# Patient Record
Sex: MALE | Race: WHITE | Employment: FULL TIME | URBAN - METROPOLITAN AREA
[De-identification: names, ages, dates, MRNs, and addresses within clinical notes are randomized per-mention and may not be internally consistent; named-entity substitution may affect disease eponyms.]

---

## 2018-06-26 ENCOUNTER — HOSPITAL ENCOUNTER (INPATIENT)
Facility: CLINIC | Age: 63
LOS: 2 days | Discharge: HOME | DRG: 481 | End: 2018-06-29
Attending: EMERGENCY MEDICINE
Payer: OTHER MISCELLANEOUS

## 2018-06-26 ENCOUNTER — APPOINTMENT (OUTPATIENT)
Dept: GENERAL RADIOLOGY | Facility: CLINIC | Age: 63
DRG: 481 | End: 2018-06-26
Attending: EMERGENCY MEDICINE
Payer: OTHER MISCELLANEOUS
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
## 2018-06-26 DIAGNOSIS — S72.002A CLOSED FRACTURE OF NECK OF LEFT FEMUR, INITIAL ENCOUNTER (H): ICD-10-CM

## 2018-06-26 PROBLEM — N52.9 ED (ERECTILE DYSFUNCTION): Status: ACTIVE | Noted: 2018-06-26

## 2018-06-26 PROBLEM — E78.2 MIXED HYPERLIPIDEMIA: Status: ACTIVE | Noted: 2018-06-26
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
## 2018-06-26 PROBLEM — E87.6 HYPOKALEMIA: Status: ACTIVE | Noted: 2018-06-26

## 2018-06-26 PROBLEM — Q23.81 BICUSPID AORTIC VALVE: Status: ACTIVE | Noted: 2018-06-26

## 2018-06-26 PROBLEM — F17.200 TOBACCO DEPENDENCE SYNDROME: Status: ACTIVE | Noted: 2018-06-26

## 2018-06-26 PROBLEM — R91.8 PULMONARY NODULES: Status: ACTIVE | Noted: 2018-06-26

## 2018-06-26 PROBLEM — D72.829 LEUKOCYTOSIS: Status: ACTIVE | Noted: 2018-06-26

## 2018-06-26 PROBLEM — I35.0 AORTIC VALVE STENOSIS: Status: ACTIVE | Noted: 2018-06-26

## 2018-06-26 PROBLEM — Z79.4 LONG-TERM INSULIN USE IN TYPE 2 DIABETES (H): Status: ACTIVE | Noted: 2018-06-26

## 2018-06-26 PROBLEM — E11.9 LONG-TERM INSULIN USE IN TYPE 2 DIABETES (H): Status: ACTIVE | Noted: 2018-06-26

## 2018-06-26 PROBLEM — F43.23 ADJUSTMENT DISORDER WITH MIXED ANXIETY AND DEPRESSED MOOD: Status: ACTIVE | Noted: 2018-06-26

## 2018-06-26 PROBLEM — I10 BENIGN ESSENTIAL HYPERTENSION: Status: ACTIVE | Noted: 2018-06-26

## 2018-06-26 LAB
ANION GAP SERPL CALCULATED.3IONS-SCNC: 4 MMOL/L (ref 3–14)
BASOPHILS # BLD AUTO: 0 10E9/L (ref 0–0.2)
BASOPHILS NFR BLD AUTO: 0.1 %
BUN SERPL-MCNC: 28 MG/DL (ref 7–30)
CALCIUM SERPL-MCNC: 8.9 MG/DL (ref 8.5–10.1)
CHLORIDE SERPL-SCNC: 103 MMOL/L (ref 94–109)
CO2 SERPL-SCNC: 28 MMOL/L (ref 20–32)
CREAT SERPL-MCNC: 0.97 MG/DL (ref 0.66–1.25)
DIFFERENTIAL METHOD BLD: ABNORMAL
EOSINOPHIL # BLD AUTO: 0.1 10E9/L (ref 0–0.7)
EOSINOPHIL NFR BLD AUTO: 0.5 %
ERYTHROCYTE [DISTWIDTH] IN BLOOD BY AUTOMATED COUNT: 13.4 % (ref 10–15)
GFR SERPL CREATININE-BSD FRML MDRD: 78 ML/MIN/1.7M2
GLUCOSE BLDC GLUCOMTR-MCNC: 114 MG/DL (ref 70–99)
GLUCOSE SERPL-MCNC: 62 MG/DL (ref 70–99)
HCT VFR BLD AUTO: 41.7 % (ref 40–53)
HGB BLD-MCNC: 13.5 G/DL (ref 13.3–17.7)
IMM GRANULOCYTES # BLD: 0.1 10E9/L (ref 0–0.4)
IMM GRANULOCYTES NFR BLD: 0.5 %
LYMPHOCYTES # BLD AUTO: 1.3 10E9/L (ref 0.8–5.3)
LYMPHOCYTES NFR BLD AUTO: 7.3 %
MCH RBC QN AUTO: 27.6 PG (ref 26.5–33)
MCHC RBC AUTO-ENTMCNC: 32.4 G/DL (ref 31.5–36.5)
MCV RBC AUTO: 85 FL (ref 78–100)
MONOCYTES # BLD AUTO: 0.5 10E9/L (ref 0–1.3)
MONOCYTES NFR BLD AUTO: 3.1 %
NEUTROPHILS # BLD AUTO: 15.2 10E9/L (ref 1.6–8.3)
NEUTROPHILS NFR BLD AUTO: 88.5 %
NRBC # BLD AUTO: 0 10*3/UL
NRBC BLD AUTO-RTO: 0 /100
PLATELET # BLD AUTO: 222 10E9/L (ref 150–450)
POTASSIUM SERPL-SCNC: 3.3 MMOL/L (ref 3.4–5.3)
RBC # BLD AUTO: 4.9 10E12/L (ref 4.4–5.9)
SODIUM SERPL-SCNC: 135 MMOL/L (ref 133–144)
WBC # BLD AUTO: 17.2 10E9/L (ref 4–11)

## 2018-06-26 PROCEDURE — 96374 THER/PROPH/DIAG INJ IV PUSH: CPT | Performed by: EMERGENCY MEDICINE

## 2018-06-26 PROCEDURE — 99220 ZZC INITIAL OBSERVATION CARE,LEVL III: CPT | Performed by: PHYSICIAN ASSISTANT

## 2018-06-26 PROCEDURE — 25000128 H RX IP 250 OP 636: Performed by: EMERGENCY MEDICINE

## 2018-06-26 PROCEDURE — 85025 COMPLETE CBC W/AUTO DIFF WBC: CPT | Performed by: EMERGENCY MEDICINE

## 2018-06-26 PROCEDURE — 00000146 ZZHCL STATISTIC GLUCOSE BY METER IP

## 2018-06-26 PROCEDURE — G0378 HOSPITAL OBSERVATION PER HR: HCPCS

## 2018-06-26 PROCEDURE — 99285 EMERGENCY DEPT VISIT HI MDM: CPT | Mod: Z6 | Performed by: EMERGENCY MEDICINE

## 2018-06-26 PROCEDURE — 25000132 ZZH RX MED GY IP 250 OP 250 PS 637: Performed by: PHYSICIAN ASSISTANT

## 2018-06-26 PROCEDURE — 80048 BASIC METABOLIC PNL TOTAL CA: CPT | Performed by: EMERGENCY MEDICINE

## 2018-06-26 PROCEDURE — 96376 TX/PRO/DX INJ SAME DRUG ADON: CPT

## 2018-06-26 PROCEDURE — 25000132 ZZH RX MED GY IP 250 OP 250 PS 637: Performed by: EMERGENCY MEDICINE

## 2018-06-26 PROCEDURE — 73502 X-RAY EXAM HIP UNI 2-3 VIEWS: CPT

## 2018-06-26 PROCEDURE — 72100 X-RAY EXAM L-S SPINE 2/3 VWS: CPT

## 2018-06-26 PROCEDURE — 99285 EMERGENCY DEPT VISIT HI MDM: CPT | Mod: 25 | Performed by: EMERGENCY MEDICINE

## 2018-06-26 PROCEDURE — 99207 ZZC CDG-CODE CATEGORY CHANGED: CPT | Performed by: PHYSICIAN ASSISTANT

## 2018-06-26 PROCEDURE — 96375 TX/PRO/DX INJ NEW DRUG ADDON: CPT | Performed by: EMERGENCY MEDICINE

## 2018-06-26 RX ORDER — CITALOPRAM HYDROBROMIDE 40 MG/1
40 TABLET ORAL DAILY
COMMUNITY
Start: 2017-12-19

## 2018-06-26 RX ORDER — POTASSIUM CHLORIDE 29.8 MG/ML
20 INJECTION INTRAVENOUS
Status: DISCONTINUED | OUTPATIENT
Start: 2018-06-26 | End: 2018-06-26 | Stop reason: CLARIF

## 2018-06-26 RX ORDER — POTASSIUM CHLORIDE 1500 MG/1
20-40 TABLET, EXTENDED RELEASE ORAL
Status: DISCONTINUED | OUTPATIENT
Start: 2018-06-26 | End: 2018-06-29 | Stop reason: HOSPADM

## 2018-06-26 RX ORDER — ACETAMINOPHEN 650 MG/1
650 SUPPOSITORY RECTAL EVERY 4 HOURS PRN
Status: DISCONTINUED | OUTPATIENT
Start: 2018-06-26 | End: 2018-06-29 | Stop reason: HOSPADM

## 2018-06-26 RX ORDER — CITALOPRAM HYDROBROMIDE 20 MG/10ML
40 SOLUTION, ORAL ORAL DAILY
COMMUNITY
End: 2018-06-26

## 2018-06-26 RX ORDER — HYDROXYZINE HYDROCHLORIDE 50 MG/ML
50 INJECTION, SOLUTION INTRAMUSCULAR EVERY 6 HOURS PRN
Status: DISCONTINUED | OUTPATIENT
Start: 2018-06-26 | End: 2018-06-26

## 2018-06-26 RX ORDER — HYDROCHLOROTHIAZIDE 25 MG/1
25 TABLET ORAL DAILY
Status: DISCONTINUED | OUTPATIENT
Start: 2018-06-27 | End: 2018-06-27

## 2018-06-26 RX ORDER — PROCHLORPERAZINE MALEATE 10 MG
10 TABLET ORAL EVERY 6 HOURS PRN
Status: DISCONTINUED | OUTPATIENT
Start: 2018-06-26 | End: 2018-06-29 | Stop reason: HOSPADM

## 2018-06-26 RX ORDER — NICOTINE POLACRILEX 4 MG
15-30 LOZENGE BUCCAL
Status: DISCONTINUED | OUTPATIENT
Start: 2018-06-26 | End: 2018-06-29 | Stop reason: HOSPADM

## 2018-06-26 RX ORDER — LISINOPRIL AND HYDROCHLOROTHIAZIDE 12.5; 2 MG/1; MG/1
2 TABLET ORAL DAILY
Status: DISCONTINUED | OUTPATIENT
Start: 2018-06-27 | End: 2018-06-26 | Stop reason: CLARIF

## 2018-06-26 RX ORDER — POTASSIUM CHLORIDE 1.5 G/1.58G
20-40 POWDER, FOR SOLUTION ORAL
Status: DISCONTINUED | OUTPATIENT
Start: 2018-06-26 | End: 2018-06-29 | Stop reason: HOSPADM

## 2018-06-26 RX ORDER — NALOXONE HYDROCHLORIDE 0.4 MG/ML
.1-.4 INJECTION, SOLUTION INTRAMUSCULAR; INTRAVENOUS; SUBCUTANEOUS
Status: DISCONTINUED | OUTPATIENT
Start: 2018-06-26 | End: 2018-06-27

## 2018-06-26 RX ORDER — POTASSIUM CL/LIDO/0.9 % NACL 10MEQ/0.1L
10 INTRAVENOUS SOLUTION, PIGGYBACK (ML) INTRAVENOUS
Status: DISCONTINUED | OUTPATIENT
Start: 2018-06-26 | End: 2018-06-29 | Stop reason: HOSPADM

## 2018-06-26 RX ORDER — HYDROXYZINE HYDROCHLORIDE 25 MG/1
25 TABLET, FILM COATED ORAL EVERY 6 HOURS PRN
Status: DISCONTINUED | OUTPATIENT
Start: 2018-06-26 | End: 2018-06-29 | Stop reason: HOSPADM

## 2018-06-26 RX ORDER — VARENICLINE TARTRATE 1 MG/1
1 TABLET, FILM COATED ORAL 2 TIMES DAILY WITH MEALS
COMMUNITY
Start: 2018-06-19 | End: 2019-01-10

## 2018-06-26 RX ORDER — DEXTROSE MONOHYDRATE 25 G/50ML
25-50 INJECTION, SOLUTION INTRAVENOUS
Status: DISCONTINUED | OUTPATIENT
Start: 2018-06-26 | End: 2018-06-29 | Stop reason: HOSPADM

## 2018-06-26 RX ORDER — KETOROLAC TROMETHAMINE 30 MG/ML
30 INJECTION, SOLUTION INTRAMUSCULAR; INTRAVENOUS EVERY 6 HOURS PRN
Status: DISCONTINUED | OUTPATIENT
Start: 2018-06-26 | End: 2018-06-29 | Stop reason: HOSPADM

## 2018-06-26 RX ORDER — ONDANSETRON 2 MG/ML
4 INJECTION INTRAMUSCULAR; INTRAVENOUS EVERY 6 HOURS PRN
Status: DISCONTINUED | OUTPATIENT
Start: 2018-06-26 | End: 2018-06-27

## 2018-06-26 RX ORDER — OXYCODONE HYDROCHLORIDE 5 MG/1
5-10 TABLET ORAL
Status: DISCONTINUED | OUTPATIENT
Start: 2018-06-26 | End: 2018-06-29 | Stop reason: HOSPADM

## 2018-06-26 RX ORDER — ACETAMINOPHEN 325 MG/1
650 TABLET ORAL EVERY 4 HOURS PRN
Status: DISCONTINUED | OUTPATIENT
Start: 2018-06-26 | End: 2018-06-29 | Stop reason: HOSPADM

## 2018-06-26 RX ORDER — HYDROMORPHONE HYDROCHLORIDE 1 MG/ML
.3-.5 INJECTION, SOLUTION INTRAMUSCULAR; INTRAVENOUS; SUBCUTANEOUS
Status: DISCONTINUED | OUTPATIENT
Start: 2018-06-26 | End: 2018-06-29 | Stop reason: HOSPADM

## 2018-06-26 RX ORDER — HYDROXYZINE HYDROCHLORIDE 50 MG/1
50 TABLET, FILM COATED ORAL EVERY 6 HOURS PRN
Status: DISCONTINUED | OUTPATIENT
Start: 2018-06-26 | End: 2018-06-29 | Stop reason: HOSPADM

## 2018-06-26 RX ORDER — POTASSIUM CHLORIDE 7.45 MG/ML
10 INJECTION INTRAVENOUS
Status: DISCONTINUED | OUTPATIENT
Start: 2018-06-26 | End: 2018-06-29 | Stop reason: HOSPADM

## 2018-06-26 RX ORDER — LIDOCAINE 40 MG/G
CREAM TOPICAL
Status: DISCONTINUED | OUTPATIENT
Start: 2018-06-26 | End: 2018-06-29 | Stop reason: HOSPADM

## 2018-06-26 RX ORDER — ONDANSETRON 2 MG/ML
4 INJECTION INTRAMUSCULAR; INTRAVENOUS EVERY 6 HOURS PRN
Status: DISCONTINUED | OUTPATIENT
Start: 2018-06-26 | End: 2018-06-26

## 2018-06-26 RX ORDER — ACYCLOVIR 50 MG/G
CREAM TOPICAL
COMMUNITY
Start: 2016-02-29 | End: 2019-01-10

## 2018-06-26 RX ORDER — CITALOPRAM HYDROBROMIDE 40 MG/1
40 TABLET ORAL DAILY
Status: DISCONTINUED | OUTPATIENT
Start: 2018-06-27 | End: 2018-06-29 | Stop reason: HOSPADM

## 2018-06-26 RX ORDER — ACETAMINOPHEN 325 MG/1
650 TABLET ORAL EVERY 4 HOURS PRN
Status: DISCONTINUED | OUTPATIENT
Start: 2018-06-26 | End: 2018-06-27

## 2018-06-26 RX ORDER — OXYCODONE HYDROCHLORIDE 5 MG/1
10 TABLET ORAL ONCE
Status: COMPLETED | OUTPATIENT
Start: 2018-06-26 | End: 2018-06-26

## 2018-06-26 RX ORDER — PROCHLORPERAZINE 25 MG
25 SUPPOSITORY, RECTAL RECTAL EVERY 12 HOURS PRN
Status: DISCONTINUED | OUTPATIENT
Start: 2018-06-26 | End: 2018-06-29 | Stop reason: HOSPADM

## 2018-06-26 RX ORDER — VALACYCLOVIR HYDROCHLORIDE 1 G/1
2 TABLET, FILM COATED ORAL 2 TIMES DAILY PRN
COMMUNITY
Start: 2017-10-09 | End: 2019-01-10

## 2018-06-26 RX ORDER — NALOXONE HYDROCHLORIDE 0.4 MG/ML
.1-.4 INJECTION, SOLUTION INTRAMUSCULAR; INTRAVENOUS; SUBCUTANEOUS
Status: DISCONTINUED | OUTPATIENT
Start: 2018-06-26 | End: 2018-06-26

## 2018-06-26 RX ORDER — ONDANSETRON 4 MG/1
4 TABLET, ORALLY DISINTEGRATING ORAL EVERY 6 HOURS PRN
Status: DISCONTINUED | OUTPATIENT
Start: 2018-06-26 | End: 2018-06-27

## 2018-06-26 RX ORDER — PROCHLORPERAZINE 25 MG
25 SUPPOSITORY, RECTAL RECTAL EVERY 12 HOURS PRN
Status: DISCONTINUED | OUTPATIENT
Start: 2018-06-26 | End: 2018-06-27

## 2018-06-26 RX ORDER — PROCHLORPERAZINE MALEATE 10 MG
10 TABLET ORAL EVERY 6 HOURS PRN
Status: DISCONTINUED | OUTPATIENT
Start: 2018-06-26 | End: 2018-06-27

## 2018-06-26 RX ORDER — LISINOPRIL AND HYDROCHLOROTHIAZIDE 12.5; 2 MG/1; MG/1
2 TABLET ORAL DAILY
COMMUNITY
Start: 2018-06-19

## 2018-06-26 RX ORDER — LISINOPRIL 40 MG/1
40 TABLET ORAL DAILY
Status: DISCONTINUED | OUTPATIENT
Start: 2018-06-27 | End: 2018-06-27

## 2018-06-26 RX ORDER — INSULIN GLARGINE 100 [IU]/ML
44 INJECTION, SOLUTION SUBCUTANEOUS EVERY MORNING
COMMUNITY

## 2018-06-26 RX ORDER — HYDROMORPHONE HYDROCHLORIDE 1 MG/ML
0.5 INJECTION, SOLUTION INTRAMUSCULAR; INTRAVENOUS; SUBCUTANEOUS
Status: DISCONTINUED | OUTPATIENT
Start: 2018-06-26 | End: 2018-06-26 | Stop reason: DRUGHIGH

## 2018-06-26 RX ORDER — ONDANSETRON 4 MG/1
4 TABLET, ORALLY DISINTEGRATING ORAL EVERY 6 HOURS PRN
Status: DISCONTINUED | OUTPATIENT
Start: 2018-06-26 | End: 2018-06-26

## 2018-06-26 RX ADMIN — HYDROXYZINE 50 MG: 50 TABLET, FILM COATED ORAL at 22:21

## 2018-06-26 RX ADMIN — HYDROMORPHONE HYDROCHLORIDE 0.5 MG: 1 INJECTION, SOLUTION INTRAMUSCULAR; INTRAVENOUS; SUBCUTANEOUS at 19:51

## 2018-06-26 RX ADMIN — HYDROMORPHONE HYDROCHLORIDE 0.5 MG: 1 INJECTION, SOLUTION INTRAMUSCULAR; INTRAVENOUS; SUBCUTANEOUS at 19:12

## 2018-06-26 RX ADMIN — OXYCODONE HYDROCHLORIDE 10 MG: 5 TABLET ORAL at 21:35

## 2018-06-26 RX ADMIN — OXYCODONE HYDROCHLORIDE 10 MG: 5 TABLET ORAL at 17:55

## 2018-06-26 RX ADMIN — HYDROMORPHONE HYDROCHLORIDE 0.5 MG: 1 INJECTION, SOLUTION INTRAMUSCULAR; INTRAVENOUS; SUBCUTANEOUS at 21:35

## 2018-06-26 NOTE — IP AVS SNAPSHOT
[facility]    [address] [address]    Phone:  [phone]    Fax:  [phone]                                       After Visit Summary   6/26/2018    Calos Callahan    MRN: [id number]           After Visit Summary Signature Page     I have received my discharge instructions, and my questions have been answered. I have discussed any challenges I see with this plan with the nurse or doctor.    ..........................................................................................................................................  Patient/Patient Representative Signature      ..........................................................................................................................................  Patient Representative Print Name and Relationship to Patient    ..................................................               ................................................  Date                                            Time    ..........................................................................................................................................  Reviewed by Signature/Title    ...................................................              ..............................................  Date                                                            Time

## 2018-06-26 NOTE — ED TRIAGE NOTES
Pt fell from a ladder, 5 feet, landing on left leg. Has left hip pain with bruising present. Unable to bear weight now. Left elbow abrasion also present. Denies LOC.

## 2018-06-26 NOTE — ED PROVIDER NOTES
"  History     Chief Complaint   Patient presents with     Fall     HPI  Calos Callahan is a 63 year old male who presents for evaluation after a fall.  The patient was working, says he was about 5 feet up on a ladder when he lost his balance and jumped from the latter to prevent falling.  He landed directly on the heel of his left leg and had immediate onset of left hip pain and fell to the ground.  He did not hit his head or loss of consciousness.  He is complaining of severe pain in the left hip, worse with any movement, rated as severe, described as throbbing and aching.  He has not taken ibuprofen with no improvement in symptoms.  He denies headache, neck pain, nausea, vomiting, drainage from the ears or nose, chest pain, difficulty breathing, abdominal pain.    Problem List:    There are no active problems to display for this patient.       Past Medical History:    Past Medical History:   Diagnosis Date     Depressive disorder      Diabetes (H)      Hypertension      Murmur        Past Surgical History:    History reviewed. No pertinent surgical history.    Family History:    No family history on file.    Social History:  Marital Status:   [2]  Social History   Substance Use Topics     Smoking status: Light Tobacco Smoker     Packs/day: 0.25     Smokeless tobacco: Never Used      Comment: cigars, quitting     Alcohol use Yes        Medications:      No current outpatient prescriptions on file.      Review of Systems  Pertinent positives and negatives listed in the HPI, all other systems reviewed and are negative.    Physical Exam   BP: 148/81  Pulse: 74  Temp: 97.7  F (36.5  C)  Resp: 18  Height: 185.4 cm (6' 1\")  Weight: 94.3 kg (208 lb)  SpO2: 96 %      Physical Exam  /67  Pulse 68  Temp 97.7  F (36.5  C) (Oral)  Resp 16  Ht 1.854 m (6' 1\")  Wt 94.3 kg (208 lb)  SpO2 90%  BMI 27.44 kg/m2   GENERAL: Alert, cooperative, moderate distress  HEAD: No scalp laceration, hematoma, or abrasion.  " No tenderness.  EYES: Conjunctivae clear, EOM intact. PERLL, Pupils are 3 mm.  HEENT:  Normal external ears, normal TM's without evidence of hemotympanum.  No nasal discharge or evidence of septal hematoma. No facial instability or asymmetry. No evidence of intraoral trauma.  Intact bite without malalignment.  NECK: Full painless range of motion.  No midline tenderness, no lateral tenderness.  CHEST:  No crepitus or tenderness with AP or lateral compression  CARDIOVASCULAR : Nml rate, distal pulses are normal and symmetric  LUNGS: No respiratory distress.  GI: Soft, ND, NT.   PELVIS: No crepitus or tenderness with AP or lateral compression  BACK: No step-offs palpated, no tenderness to palpation of thoracic or lumbar spine.  EXTREMITIES: No obvious deformities, tenderness to palpation of left hip with pain elicited by any movement of the hip  NEUROLOGICAL : GCS= 15.  Awake, alert, and oriented x 3.  Cranial nerves II-XII grossly intact. Normal muscle strength and tone, sensation to light touch grossly intact in all extremities.  No focal deficits.   SKIN : Normal color, no jaundice or rash. Left hip and left elbow abrasions.      ED Course     ED Course     Procedures               Critical Care time:  none               Results for orders placed or performed during the hospital encounter of 06/26/18 (from the past 24 hour(s))   Pelvis XR w/ unilateral hip left    Narrative    PELVIS WITH LEFT HIP LATERAL TWO VIEWS  6/26/2018 6:27 PM     HISTORY: Hip pain, jump from ladder.     COMPARISON: None.      Impression    IMPRESSION: There is a probable fracture through the left femoral neck  which probably extends into the intertrochanteric region. If  indicated, CT might be helpful in further evaluation.    SOWMYA CAT MD   Basic metabolic panel   Result Value Ref Range    Sodium 135 133 - 144 mmol/L    Potassium 3.3 (L) 3.4 - 5.3 mmol/L    Chloride 103 94 - 109 mmol/L    Carbon Dioxide 28 20 - 32 mmol/L    Anion Gap 4 3  - 14 mmol/L    Glucose 62 (L) 70 - 99 mg/dL    Urea Nitrogen 28 7 - 30 mg/dL    Creatinine 0.97 0.66 - 1.25 mg/dL    GFR Estimate 78 >60 mL/min/1.7m2    GFR Estimate If Black >90 >60 mL/min/1.7m2    Calcium 8.9 8.5 - 10.1 mg/dL   CBC with platelets differential   Result Value Ref Range    WBC 17.2 (H) 4.0 - 11.0 10e9/L    RBC Count 4.90 4.4 - 5.9 10e12/L    Hemoglobin 13.5 13.3 - 17.7 g/dL    Hematocrit 41.7 40.0 - 53.0 %    MCV 85 78 - 100 fl    MCH 27.6 26.5 - 33.0 pg    MCHC 32.4 31.5 - 36.5 g/dL    RDW 13.4 10.0 - 15.0 %    Platelet Count 222 150 - 450 10e9/L    Diff Method Automated Method     % Neutrophils 88.5 %    % Lymphocytes 7.3 %    % Monocytes 3.1 %    % Eosinophils 0.5 %    % Basophils 0.1 %    % Immature Granulocytes 0.5 %    Nucleated RBCs 0 0 /100    Absolute Neutrophil 15.2 (H) 1.6 - 8.3 10e9/L    Absolute Lymphocytes 1.3 0.8 - 5.3 10e9/L    Absolute Monocytes 0.5 0.0 - 1.3 10e9/L    Absolute Eosinophils 0.1 0.0 - 0.7 10e9/L    Absolute Basophils 0.0 0.0 - 0.2 10e9/L    Abs Immature Granulocytes 0.1 0 - 0.4 10e9/L    Absolute Nucleated RBC 0.0        Medications   HYDROmorphone (PF) (DILAUDID) injection 0.5 mg (0.5 mg Intravenous Given 6/26/18 1951)   oxyCODONE IR (ROXICODONE) tablet 10 mg (10 mg Oral Given 6/26/18 1755)       Assessments & Plan (with Medical Decision Making)   63-year-old male who presents for left hip pain after a fall.  Temperature is 36.5 C, heart 74, SPO2 is 96% on room air.  Is given oral oxycodone for pain.  X-ray of the hip and L spine obtained, images reviewed independently as well as radiology read reviewed, positive for fracture of the left femoral neck.  On recheck the patient is comfortable.  I have discussed the case with the on-call orthopedic surgeon and have forwarded to him pictures of the XR images.  The plan is to bring the patient to the operating room tomorrow. The patient is admitted to the medicine service for further treatment. I have discussed the case  with the oncall hospitalist Karen GRIJALVA who agrees to accept the patient to her service.    I have reviewed the nursing notes.    I have reviewed the findings, diagnosis, plan and need for follow up with the patient.       Current Discharge Medication List          Final diagnoses:   Closed fracture of neck of left femur, initial encounter (H)       6/26/2018   [facility] EMERGENCY DEPARTMENT     Alvaro Martin MD  06/26/18 8522

## 2018-06-27 ENCOUNTER — APPOINTMENT (OUTPATIENT)
Dept: GENERAL RADIOLOGY | Facility: CLINIC | Age: 63
DRG: 481 | End: 2018-06-27
Attending: ORTHOPAEDIC SURGERY
Payer: OTHER MISCELLANEOUS

## 2018-06-27 ENCOUNTER — ANESTHESIA EVENT (OUTPATIENT)
Dept: SURGERY | Facility: CLINIC | Age: 63
DRG: 481 | End: 2018-06-27
Payer: OTHER MISCELLANEOUS

## 2018-06-27 ENCOUNTER — ANESTHESIA (OUTPATIENT)
Dept: SURGERY | Facility: CLINIC | Age: 63
DRG: 481 | End: 2018-06-27
Payer: OTHER MISCELLANEOUS

## 2018-06-27 ENCOUNTER — SURGERY (OUTPATIENT)
Age: 63
End: 2018-06-27

## 2018-06-27 PROBLEM — S72.009A HIP FRACTURE REQUIRING OPERATIVE REPAIR (H): Status: ACTIVE | Noted: 2018-06-27

## 2018-06-27 LAB
ERYTHROCYTE [DISTWIDTH] IN BLOOD BY AUTOMATED COUNT: 13.6 % (ref 10–15)
GLUCOSE BLDC GLUCOMTR-MCNC: 100 MG/DL (ref 70–99)
GLUCOSE BLDC GLUCOMTR-MCNC: 109 MG/DL (ref 70–99)
GLUCOSE BLDC GLUCOMTR-MCNC: 119 MG/DL (ref 70–99)
GLUCOSE BLDC GLUCOMTR-MCNC: 260 MG/DL (ref 70–99)
GLUCOSE BLDC GLUCOMTR-MCNC: 324 MG/DL (ref 70–99)
HCT VFR BLD AUTO: 38.9 % (ref 40–53)
HGB BLD-MCNC: 12.4 G/DL (ref 13.3–17.7)
MCH RBC QN AUTO: 27.5 PG (ref 26.5–33)
MCHC RBC AUTO-ENTMCNC: 31.9 G/DL (ref 31.5–36.5)
MCV RBC AUTO: 86 FL (ref 78–100)
PLATELET # BLD AUTO: 162 10E9/L (ref 150–450)
POTASSIUM SERPL-SCNC: 3.7 MMOL/L (ref 3.4–5.3)
RBC # BLD AUTO: 4.51 10E12/L (ref 4.4–5.9)
WBC # BLD AUTO: 10.9 10E9/L (ref 4–11)

## 2018-06-27 PROCEDURE — 37000008 ZZH ANESTHESIA TECHNICAL FEE, 1ST 30 MIN: Performed by: ORTHOPAEDIC SURGERY

## 2018-06-27 PROCEDURE — 25000132 ZZH RX MED GY IP 250 OP 250 PS 637: Performed by: EMERGENCY MEDICINE

## 2018-06-27 PROCEDURE — 25800025 ZZH RX 258: Performed by: ORTHOPAEDIC SURGERY

## 2018-06-27 PROCEDURE — 71000015 ZZH RECOVERY PHASE 1 LEVEL 2 EA ADDTL HR: Performed by: ORTHOPAEDIC SURGERY

## 2018-06-27 PROCEDURE — 36000065 ZZH SURGERY LEVEL 4 W FLUORO 1ST 30 MIN: Performed by: ORTHOPAEDIC SURGERY

## 2018-06-27 PROCEDURE — 25000132 ZZH RX MED GY IP 250 OP 250 PS 637: Performed by: PHYSICIAN ASSISTANT

## 2018-06-27 PROCEDURE — 85027 COMPLETE CBC AUTOMATED: CPT | Performed by: PHYSICIAN ASSISTANT

## 2018-06-27 PROCEDURE — 25000132 ZZH RX MED GY IP 250 OP 250 PS 637

## 2018-06-27 PROCEDURE — 25000128 H RX IP 250 OP 636: Performed by: PHYSICIAN ASSISTANT

## 2018-06-27 PROCEDURE — 96375 TX/PRO/DX INJ NEW DRUG ADDON: CPT

## 2018-06-27 PROCEDURE — 25000132 ZZH RX MED GY IP 250 OP 250 PS 637: Performed by: ORTHOPAEDIC SURGERY

## 2018-06-27 PROCEDURE — 25000128 H RX IP 250 OP 636: Performed by: NURSE ANESTHETIST, CERTIFIED REGISTERED

## 2018-06-27 PROCEDURE — 25000131 ZZH RX MED GY IP 250 OP 636 PS 637

## 2018-06-27 PROCEDURE — 99233 SBSQ HOSP IP/OBS HIGH 50: CPT

## 2018-06-27 PROCEDURE — C1713 ANCHOR/SCREW BN/BN,TIS/BN: HCPCS | Performed by: ORTHOPAEDIC SURGERY

## 2018-06-27 PROCEDURE — 37000009 ZZH ANESTHESIA TECHNICAL FEE, EACH ADDTL 15 MIN: Performed by: ORTHOPAEDIC SURGERY

## 2018-06-27 PROCEDURE — 25000566 ZZH SEVOFLURANE, EA 15 MIN: Performed by: ORTHOPAEDIC SURGERY

## 2018-06-27 PROCEDURE — 36415 COLL VENOUS BLD VENIPUNCTURE: CPT

## 2018-06-27 PROCEDURE — 40000306 ZZH STATISTIC PRE PROC ASSESS II: Performed by: ORTHOPAEDIC SURGERY

## 2018-06-27 PROCEDURE — 25000128 H RX IP 250 OP 636: Performed by: EMERGENCY MEDICINE

## 2018-06-27 PROCEDURE — 00000146 ZZHCL STATISTIC GLUCOSE BY METER IP

## 2018-06-27 PROCEDURE — 0QH734Z INSERTION OF INTERNAL FIXATION DEVICE INTO LEFT UPPER FEMUR, PERCUTANEOUS APPROACH: ICD-10-PCS | Performed by: ORTHOPAEDIC SURGERY

## 2018-06-27 PROCEDURE — 93005 ELECTROCARDIOGRAM TRACING: CPT

## 2018-06-27 PROCEDURE — G0378 HOSPITAL OBSERVATION PER HR: HCPCS

## 2018-06-27 PROCEDURE — 40000277 XR SURGERY CARM FLUORO LESS THAN 5 MIN W STILLS

## 2018-06-27 PROCEDURE — 96376 TX/PRO/DX INJ SAME DRUG ADON: CPT

## 2018-06-27 PROCEDURE — 25000125 ZZHC RX 250: Performed by: NURSE ANESTHETIST, CERTIFIED REGISTERED

## 2018-06-27 PROCEDURE — 36415 COLL VENOUS BLD VENIPUNCTURE: CPT | Performed by: PHYSICIAN ASSISTANT

## 2018-06-27 PROCEDURE — 12000007 ZZH R&B INTERMEDIATE

## 2018-06-27 PROCEDURE — 25000131 ZZH RX MED GY IP 250 OP 636 PS 637: Performed by: PHYSICIAN ASSISTANT

## 2018-06-27 PROCEDURE — 36000063 ZZH SURGERY LEVEL 4 EA 15 ADDTL MIN: Performed by: ORTHOPAEDIC SURGERY

## 2018-06-27 PROCEDURE — 71000014 ZZH RECOVERY PHASE 1 LEVEL 2 FIRST HR: Performed by: ORTHOPAEDIC SURGERY

## 2018-06-27 PROCEDURE — 25000128 H RX IP 250 OP 636: Performed by: ORTHOPAEDIC SURGERY

## 2018-06-27 PROCEDURE — 84132 ASSAY OF SERUM POTASSIUM: CPT

## 2018-06-27 DEVICE — IMPLANTABLE DEVICE
Type: IMPLANTABLE DEVICE | Site: HIP | Status: NON-FUNCTIONAL
Removed: 2019-01-11

## 2018-06-27 RX ORDER — BACLOFEN 10 MG/1
10 TABLET ORAL 3 TIMES DAILY
Status: DISCONTINUED | OUTPATIENT
Start: 2018-06-27 | End: 2018-06-29 | Stop reason: HOSPADM

## 2018-06-27 RX ORDER — PROCHLORPERAZINE MALEATE 10 MG
10 TABLET ORAL EVERY 6 HOURS PRN
Status: DISCONTINUED | OUTPATIENT
Start: 2018-06-27 | End: 2018-06-27

## 2018-06-27 RX ORDER — OXYCODONE HYDROCHLORIDE 5 MG/1
5-10 TABLET ORAL
Status: DISCONTINUED | OUTPATIENT
Start: 2018-06-27 | End: 2018-06-27

## 2018-06-27 RX ORDER — GABAPENTIN 300 MG/1
300 CAPSULE ORAL 2 TIMES DAILY
Status: DISCONTINUED | OUTPATIENT
Start: 2018-06-27 | End: 2018-06-29 | Stop reason: HOSPADM

## 2018-06-27 RX ORDER — HYDROCHLOROTHIAZIDE 25 MG/1
25 TABLET ORAL DAILY
Status: DISCONTINUED | OUTPATIENT
Start: 2018-06-28 | End: 2018-06-29 | Stop reason: HOSPADM

## 2018-06-27 RX ORDER — MORPHINE SULFATE 2 MG/ML
2-4 INJECTION, SOLUTION INTRAMUSCULAR; INTRAVENOUS
Status: DISCONTINUED | OUTPATIENT
Start: 2018-06-27 | End: 2018-06-29 | Stop reason: HOSPADM

## 2018-06-27 RX ORDER — CALCIUM CARBONATE 500 MG/1
500 TABLET, CHEWABLE ORAL 3 TIMES DAILY PRN
Status: DISCONTINUED | OUTPATIENT
Start: 2018-06-27 | End: 2018-06-29 | Stop reason: HOSPADM

## 2018-06-27 RX ORDER — CEFAZOLIN SODIUM 1 G/50ML
1 INJECTION, SOLUTION INTRAVENOUS SEE ADMIN INSTRUCTIONS
Status: DISCONTINUED | OUTPATIENT
Start: 2018-06-27 | End: 2018-06-27 | Stop reason: HOSPADM

## 2018-06-27 RX ORDER — LISINOPRIL 40 MG/1
40 TABLET ORAL
Status: DISCONTINUED | OUTPATIENT
Start: 2018-06-27 | End: 2018-06-27

## 2018-06-27 RX ORDER — GLYCOPYRROLATE 0.2 MG/ML
INJECTION, SOLUTION INTRAMUSCULAR; INTRAVENOUS PRN
Status: DISCONTINUED | OUTPATIENT
Start: 2018-06-27 | End: 2018-06-27

## 2018-06-27 RX ORDER — LIDOCAINE 40 MG/G
CREAM TOPICAL
Status: DISCONTINUED | OUTPATIENT
Start: 2018-06-27 | End: 2018-06-29 | Stop reason: HOSPADM

## 2018-06-27 RX ORDER — LIDOCAINE HYDROCHLORIDE 10 MG/ML
INJECTION, SOLUTION INFILTRATION; PERINEURAL PRN
Status: DISCONTINUED | OUTPATIENT
Start: 2018-06-27 | End: 2018-06-27

## 2018-06-27 RX ORDER — KETOROLAC TROMETHAMINE 30 MG/ML
INJECTION, SOLUTION INTRAMUSCULAR; INTRAVENOUS PRN
Status: DISCONTINUED | OUTPATIENT
Start: 2018-06-27 | End: 2018-06-27

## 2018-06-27 RX ORDER — AMOXICILLIN 250 MG
2 CAPSULE ORAL 2 TIMES DAILY
Status: DISCONTINUED | OUTPATIENT
Start: 2018-06-27 | End: 2018-06-29 | Stop reason: HOSPADM

## 2018-06-27 RX ORDER — DEXTROSE MONOHYDRATE, SODIUM CHLORIDE, AND POTASSIUM CHLORIDE 50; 1.49; 4.5 G/1000ML; G/1000ML; G/1000ML
INJECTION, SOLUTION INTRAVENOUS CONTINUOUS
Status: DISCONTINUED | OUTPATIENT
Start: 2018-06-27 | End: 2018-06-28 | Stop reason: CLARIF

## 2018-06-27 RX ORDER — ONDANSETRON 2 MG/ML
4 INJECTION INTRAMUSCULAR; INTRAVENOUS EVERY 6 HOURS PRN
Status: DISCONTINUED | OUTPATIENT
Start: 2018-06-27 | End: 2018-06-29 | Stop reason: HOSPADM

## 2018-06-27 RX ORDER — AMOXICILLIN 250 MG
1 CAPSULE ORAL 2 TIMES DAILY
Status: DISCONTINUED | OUTPATIENT
Start: 2018-06-27 | End: 2018-06-29 | Stop reason: HOSPADM

## 2018-06-27 RX ORDER — PIOGLITAZONEHYDROCHLORIDE 15 MG/1
15 TABLET ORAL DAILY
Status: DISCONTINUED | OUTPATIENT
Start: 2018-06-27 | End: 2018-06-29 | Stop reason: HOSPADM

## 2018-06-27 RX ORDER — PROPOFOL 10 MG/ML
INJECTION, EMULSION INTRAVENOUS PRN
Status: DISCONTINUED | OUTPATIENT
Start: 2018-06-27 | End: 2018-06-27

## 2018-06-27 RX ORDER — HYDROCHLOROTHIAZIDE 25 MG/1
25 TABLET ORAL
Status: DISCONTINUED | OUTPATIENT
Start: 2018-06-27 | End: 2018-06-27

## 2018-06-27 RX ORDER — ONDANSETRON 2 MG/ML
INJECTION INTRAMUSCULAR; INTRAVENOUS PRN
Status: DISCONTINUED | OUTPATIENT
Start: 2018-06-27 | End: 2018-06-27

## 2018-06-27 RX ORDER — SODIUM CHLORIDE, SODIUM LACTATE, POTASSIUM CHLORIDE, CALCIUM CHLORIDE 600; 310; 30; 20 MG/100ML; MG/100ML; MG/100ML; MG/100ML
INJECTION, SOLUTION INTRAVENOUS CONTINUOUS PRN
Status: DISCONTINUED | OUTPATIENT
Start: 2018-06-27 | End: 2018-06-27

## 2018-06-27 RX ORDER — ONDANSETRON 4 MG/1
4 TABLET, ORALLY DISINTEGRATING ORAL EVERY 6 HOURS PRN
Status: DISCONTINUED | OUTPATIENT
Start: 2018-06-27 | End: 2018-06-29 | Stop reason: HOSPADM

## 2018-06-27 RX ORDER — ZOLPIDEM TARTRATE 5 MG/1
5 TABLET ORAL
Status: DISCONTINUED | OUTPATIENT
Start: 2018-06-27 | End: 2018-06-29 | Stop reason: HOSPADM

## 2018-06-27 RX ORDER — ACETAMINOPHEN 325 MG/1
650 TABLET ORAL EVERY 4 HOURS PRN
Status: DISCONTINUED | OUTPATIENT
Start: 2018-06-30 | End: 2018-06-29 | Stop reason: HOSPADM

## 2018-06-27 RX ORDER — DEXAMETHASONE SODIUM PHOSPHATE 4 MG/ML
INJECTION, SOLUTION INTRA-ARTICULAR; INTRALESIONAL; INTRAMUSCULAR; INTRAVENOUS; SOFT TISSUE PRN
Status: DISCONTINUED | OUTPATIENT
Start: 2018-06-27 | End: 2018-06-27

## 2018-06-27 RX ORDER — ACETAMINOPHEN 325 MG/1
975 TABLET ORAL EVERY 8 HOURS
Status: DISCONTINUED | OUTPATIENT
Start: 2018-06-27 | End: 2018-06-29 | Stop reason: HOSPADM

## 2018-06-27 RX ORDER — CEFAZOLIN SODIUM 2 G/100ML
2 INJECTION, SOLUTION INTRAVENOUS EVERY 8 HOURS
Status: COMPLETED | OUTPATIENT
Start: 2018-06-27 | End: 2018-06-28

## 2018-06-27 RX ORDER — LISINOPRIL 40 MG/1
40 TABLET ORAL DAILY
Status: DISCONTINUED | OUTPATIENT
Start: 2018-06-28 | End: 2018-06-29 | Stop reason: HOSPADM

## 2018-06-27 RX ORDER — NALOXONE HYDROCHLORIDE 0.4 MG/ML
.1-.4 INJECTION, SOLUTION INTRAMUSCULAR; INTRAVENOUS; SUBCUTANEOUS
Status: DISCONTINUED | OUTPATIENT
Start: 2018-06-27 | End: 2018-06-29 | Stop reason: HOSPADM

## 2018-06-27 RX ORDER — FENTANYL CITRATE 50 UG/ML
INJECTION, SOLUTION INTRAMUSCULAR; INTRAVENOUS PRN
Status: DISCONTINUED | OUTPATIENT
Start: 2018-06-27 | End: 2018-06-27

## 2018-06-27 RX ORDER — ATORVASTATIN CALCIUM 20 MG/1
40 TABLET, FILM COATED ORAL DAILY
Status: DISCONTINUED | OUTPATIENT
Start: 2018-06-27 | End: 2018-06-29 | Stop reason: HOSPADM

## 2018-06-27 RX ORDER — HYDROXYZINE HYDROCHLORIDE 25 MG/1
25 TABLET, FILM COATED ORAL EVERY 6 HOURS PRN
Status: DISCONTINUED | OUTPATIENT
Start: 2018-06-27 | End: 2018-06-27

## 2018-06-27 RX ORDER — CEFAZOLIN SODIUM 2 G/100ML
2 INJECTION, SOLUTION INTRAVENOUS
Status: COMPLETED | OUTPATIENT
Start: 2018-06-27 | End: 2018-06-27

## 2018-06-27 RX ADMIN — KETOROLAC TROMETHAMINE 30 MG: 30 INJECTION, SOLUTION INTRAMUSCULAR at 23:52

## 2018-06-27 RX ADMIN — INSULIN GLARGINE 21 UNITS: 100 INJECTION, SOLUTION SUBCUTANEOUS at 08:57

## 2018-06-27 RX ADMIN — KETOROLAC TROMETHAMINE 30 MG: 30 INJECTION, SOLUTION INTRAMUSCULAR at 10:32

## 2018-06-27 RX ADMIN — HYDROMORPHONE HYDROCHLORIDE 0.5 MG: 1 INJECTION, SOLUTION INTRAMUSCULAR; INTRAVENOUS; SUBCUTANEOUS at 08:29

## 2018-06-27 RX ADMIN — CEFAZOLIN SODIUM 2 G: 2 INJECTION, SOLUTION INTRAVENOUS at 17:50

## 2018-06-27 RX ADMIN — FENTANYL CITRATE 50 MCG: 50 INJECTION, SOLUTION INTRAMUSCULAR; INTRAVENOUS at 10:14

## 2018-06-27 RX ADMIN — ONDANSETRON 4 MG: 2 INJECTION INTRAMUSCULAR; INTRAVENOUS at 02:02

## 2018-06-27 RX ADMIN — LIDOCAINE HYDROCHLORIDE 50 MG: 10 INJECTION, SOLUTION INFILTRATION; PERINEURAL at 09:59

## 2018-06-27 RX ADMIN — ACETAMINOPHEN 975 MG: 325 TABLET, FILM COATED ORAL at 22:05

## 2018-06-27 RX ADMIN — ONDANSETRON 4 MG: 2 INJECTION INTRAMUSCULAR; INTRAVENOUS at 09:59

## 2018-06-27 RX ADMIN — INSULIN ASPART 4 UNITS: 100 INJECTION, SOLUTION INTRAVENOUS; SUBCUTANEOUS at 17:09

## 2018-06-27 RX ADMIN — OXYCODONE HYDROCHLORIDE 5 MG: 5 TABLET ORAL at 17:01

## 2018-06-27 RX ADMIN — DEXAMETHASONE SODIUM PHOSPHATE 8 MG: 4 INJECTION, SOLUTION INTRA-ARTICULAR; INTRALESIONAL; INTRAMUSCULAR; INTRAVENOUS; SOFT TISSUE at 09:59

## 2018-06-27 RX ADMIN — GABAPENTIN 300 MG: 300 CAPSULE ORAL at 20:58

## 2018-06-27 RX ADMIN — CITALOPRAM HYDROBROMIDE 40 MG: 40 TABLET ORAL at 13:36

## 2018-06-27 RX ADMIN — KETOROLAC TROMETHAMINE 30 MG: 30 INJECTION, SOLUTION INTRAMUSCULAR at 00:12

## 2018-06-27 RX ADMIN — BACLOFEN 10 MG: 10 TABLET ORAL at 20:58

## 2018-06-27 RX ADMIN — PIOGLITAZONE 15 MG: 15 TABLET ORAL at 17:45

## 2018-06-27 RX ADMIN — CALCIUM CARBONATE (ANTACID) CHEW TAB 500 MG 500 MG: 500 CHEW TAB at 21:06

## 2018-06-27 RX ADMIN — OXYCODONE HYDROCHLORIDE 10 MG: 5 TABLET ORAL at 21:04

## 2018-06-27 RX ADMIN — PROPOFOL 150 MG: 10 INJECTION, EMULSION INTRAVENOUS at 09:59

## 2018-06-27 RX ADMIN — POTASSIUM CHLORIDE 20 MEQ: 1500 TABLET, EXTENDED RELEASE ORAL at 05:50

## 2018-06-27 RX ADMIN — OXYCODONE HYDROCHLORIDE 10 MG: 5 TABLET ORAL at 05:58

## 2018-06-27 RX ADMIN — POTASSIUM CHLORIDE 40 MEQ: 1500 TABLET, EXTENDED RELEASE ORAL at 00:19

## 2018-06-27 RX ADMIN — POTASSIUM CHLORIDE, DEXTROSE MONOHYDRATE AND SODIUM CHLORIDE: 150; 5; 450 INJECTION, SOLUTION INTRAVENOUS at 13:33

## 2018-06-27 RX ADMIN — SENNOSIDES AND DOCUSATE SODIUM 2 TABLET: 8.6; 5 TABLET ORAL at 22:05

## 2018-06-27 RX ADMIN — KETOROLAC TROMETHAMINE 30 MG: 30 INJECTION, SOLUTION INTRAMUSCULAR at 17:06

## 2018-06-27 RX ADMIN — MIDAZOLAM 2 MG: 1 INJECTION INTRAMUSCULAR; INTRAVENOUS at 09:56

## 2018-06-27 RX ADMIN — CEFAZOLIN SODIUM 2 G: 2 INJECTION, SOLUTION INTRAVENOUS at 09:55

## 2018-06-27 RX ADMIN — GABAPENTIN 300 MG: 300 CAPSULE ORAL at 13:35

## 2018-06-27 RX ADMIN — SENNOSIDES AND DOCUSATE SODIUM 2 TABLET: 8.6; 5 TABLET ORAL at 13:35

## 2018-06-27 RX ADMIN — ATORVASTATIN CALCIUM 40 MG: 20 TABLET, FILM COATED ORAL at 17:01

## 2018-06-27 RX ADMIN — FENTANYL CITRATE 50 MCG: 50 INJECTION, SOLUTION INTRAMUSCULAR; INTRAVENOUS at 10:19

## 2018-06-27 RX ADMIN — SODIUM CHLORIDE, POTASSIUM CHLORIDE, SODIUM LACTATE AND CALCIUM CHLORIDE: 600; 310; 30; 20 INJECTION, SOLUTION INTRAVENOUS at 09:55

## 2018-06-27 RX ADMIN — OXYCODONE HYDROCHLORIDE 5 MG: 5 TABLET ORAL at 14:03

## 2018-06-27 RX ADMIN — BACLOFEN 10 MG: 10 TABLET ORAL at 13:36

## 2018-06-27 RX ADMIN — ACETAMINOPHEN 975 MG: 325 TABLET, FILM COATED ORAL at 13:34

## 2018-06-27 RX ADMIN — GLYCOPYRROLATE 0.2 MG: 0.2 INJECTION, SOLUTION INTRAMUSCULAR; INTRAVENOUS at 09:59

## 2018-06-27 ASSESSMENT — ACTIVITIES OF DAILY LIVING (ADL)
NUMBER_OF_TIMES_PATIENT_HAS_FALLEN_WITHIN_LAST_SIX_MONTHS: 2
SWALLOWING: 0-->SWALLOWS FOODS/LIQUIDS WITHOUT DIFFICULTY
DRESS: 0-->INDEPENDENT
RETIRED_EATING: 0-->INDEPENDENT
BATHING: 0-->INDEPENDENT
TOILETING: 0-->INDEPENDENT
AMBULATION: 0-->INDEPENDENT
WHICH_OF_THE_ABOVE_FUNCTIONAL_RISKS_HAD_A_RECENT_ONSET_OR_CHANGE?: FALL HISTORY
FALL_HISTORY_WITHIN_LAST_SIX_MONTHS: YES
PRIOR_FUNCTIONAL_LEVEL_COMMENT: INDEPENDENT
RETIRED_COMMUNICATION: 0-->UNDERSTANDS/COMMUNICATES WITHOUT DIFFICULTY
TRANSFERRING: 0-->INDEPENDENT
COGNITION: 0 - NO COGNITION ISSUES REPORTED

## 2018-06-27 ASSESSMENT — LIFESTYLE VARIABLES: TOBACCO_USE: 1

## 2018-06-27 NOTE — PROGRESS NOTES
Patient pain not well controlled after he arrived on unit. Gave Oxycodone and Dilaudid; pain well controlled. C/O itchy skin since taking meds; asked for an order for hydroxyzine and med given. Ice applied to affected hip. Resting comfortably. BS on low side.

## 2018-06-27 NOTE — BRIEF OP NOTE
[facility]  Brief Operative Note      Pre-operative diagnosis: hip fracture   Post-operative diagnosis: Same   Procedure: Femoral neck fracture and repair (Left)   Surgeon: Donnie Tim MD     Assistant(s): Declan Chen PA-C   Anesthesia: General endotracheal anesthesia   Estimated blood loss: Minimal               Drains: None   Specimens: None       Findings: See full dictated operative note for details   Complications: None                   Comments: See dictated operative report for full details     Condition: Stable   Weight bearing status: Non-weight bearing   Activity: Activity as tolerated  Patient may move about with assist as indicated or with supervision   Anticoagulation plan:                 Lovenox inpatient and then  mg daily at discharge  for 42 days  Follow up plan                           Follow up in 2 week(s)

## 2018-06-27 NOTE — CONSULTS
[facility] Consultation    Consultation - [facility]  Level of consult: Consult, follow and place orders    Calos Reaves,  1955, MRN [id number]     Admitting Dx: Closed fracture of neck of left femur, initial encounter (H) [S72.002A]     PCP: Semmler, Steven Duane, [phone]     Code status:  Full Code     Extended Emergency Contact Information  Primary Emergency Contact: JOSE CARLOS REAVES  Address: [address] [place] [address] [place] [address] [place]  Mobile Phone: [phone]  Relation: Spouse     Assessment:  Left hip femoral neck fx with suspected greater trochanteric involvement    Plan:  Plan for surgical intervention by Dr. Tim this morning. Bedrest until post op. Pain control. NWB until post op.     Principal Problem:    Fracture of femoral neck, left (H)  Active Problems:    Mixed hyperlipidemia    Benign essential hypertension    Aortic valve stenosis    Long-term insulin use in type 2 diabetes (H)    ED (erectile dysfunction)    Bicuspid aortic valve    Pulmonary nodules    Adjustment disorder with mixed anxiety and depressed mood    Leukocytosis    Hypokalemia    Fall from ladder, initial encounter    Tobacco dependence syndrome       Chief Complaint  Fall from ladder     HPI  We have been requested to evaluate Calos Reaves who is a 63 year old year old male for fall from 5 feet off the ground. Patient was standing on a ladder when he lost his balance. To avoid falling, he jumped off the ladder. He landed all his weight on his left foot. He then fell to the ground. He reports pain to his left hip and right upper back just distal to the scapula. He denies lightheadedness/ dizziness, CP, SOB, paraesthesias, paralysis.      History is obtained from the patient     Past Medical History  Past Medical History:   Diagnosis Date     Depressive disorder      Diabetes (H)      Hypertension      Murmur        Surgical History  Past Surgical  History:   Procedure Laterality Date     basal cell - face       SHOULDER SURGERY       VASECTOMY          Social History  Social History     Social History     Marital status:      Spouse name: N/A     Number of children: N/A     Years of education: N/A     Occupational History     retired      Social History Main Topics     Smoking status: Light Tobacco Smoker     Packs/day: 0.25     Smokeless tobacco: Never Used      Comment: cigars, quitting     Alcohol use Yes      Comment: 3-4 drinks on the weekends     Drug use: No     Sexual activity: Yes     Partners: Female     Birth control/ protection: None     Other Topics Concern     Not on file     Social History Narrative     No narrative on file       Family History  Family History   Problem Relation Age of Onset     Diabetes Mother      Emphysema Mother      Coronary Artery Disease Father      KIDNEY DISEASE Father      No Known Problems Daughter         Allergies:  Review of patient's allergies indicates no known allergies.      Current Medications:  Current Facility-Administered Medications   Medication     acetaminophen (TYLENOL) Suppository 650 mg     acetaminophen (TYLENOL) tablet 650 mg     acetaminophen (TYLENOL) tablet 650 mg     baclofen (LIORESAL) tablet 10 mg     ceFAZolin (ANCEF) intermittent infusion 1 g     ceFAZolin (ANCEF) intermittent infusion 2 g in 100 mL dextrose PRE-MIX     citalopram (celeXA) tablet 40 mg     glucose gel 15-30 g    Or     dextrose 50 % injection 25-50 mL    Or     glucagon injection 1 mg     lisinopril (PRINIVIL/ZESTRIL) tablet 40 mg    Or     hydrochlorothiazide (HYDRODIURIL) tablet 25 mg     HYDROmorphone (PF) (DILAUDID) injection 0.3-0.5 mg     hydrOXYzine (ATARAX) tablet 25 mg    Or     hydrOXYzine (ATARAX) tablet 50 mg     insulin aspart (NovoLOG) inj (RAPID ACTING)     insulin aspart (NovoLOG) inj (RAPID ACTING)     insulin glargine (LANTUS) injection 21 Units     ketorolac (TORADOL) injection 30 mg     lidocaine  (LMX4) kit     lidocaine 1 % 1 mL     naloxone (NARCAN) injection 0.1-0.4 mg     ondansetron (ZOFRAN-ODT) ODT tab 4 mg    Or     ondansetron (ZOFRAN) injection 4 mg     oxyCODONE IR (ROXICODONE) tablet 5-10 mg     potassium chloride (KLOR-CON) Packet 20-40 mEq     potassium chloride 10 mEq in 100 mL intermittent infusion with 10 mg lidocaine     potassium chloride 10 mEq in 100 mL sterile water intermittent infusion (premix)     potassium chloride SA (K-DUR/KLOR-CON M) CR tablet 20-40 mEq     prochlorperazine (COMPAZINE) injection 10 mg    Or     prochlorperazine (COMPAZINE) tablet 10 mg    Or     prochlorperazine (COMPAZINE) Suppository 25 mg     prochlorperazine (COMPAZINE) injection 10 mg    Or     prochlorperazine (COMPAZINE) tablet 10 mg    Or     prochlorperazine (COMPAZINE) Suppository 25 mg     sodium chloride (PF) 0.9% PF flush 3 mL     sodium chloride (PF) 0.9% PF flush 3 mL       Review of Systems:  The Review of Systems is negative other than noted in the HPI    Physical Exam:  Temp:  [97.7  F (36.5  C)-100  F (37.8  C)] 100  F (37.8  C)  Pulse:  [68-78] 74  Resp:  [16-18] 16  BP: (101-148)/(54-81) 114/62  SpO2:  [89 %-96 %] 91 %    Gen: alert and orientated, no apparent distress, slightly drowsy  MSK: BUE: full ROM, no obvious deformity. No pain with motion.   Thoracic spine: deferred at this time due to known hip fx.   RLE: gentle motion to hip and knee tolerated, distal mobility intact. No sensory deficits. Calf supple and non-tender. PF/DF intact.   LLE: leg is shortened and externally rotated but this may be just positioning. Distally no sensory deficits noted. PF/DF/ EHL intact.      Pertinent Labs  Lab Results: personally reviewed.  Lab Results   Component Value Date    WBC 10.9 06/27/2018    HGB 12.4 (L) 06/27/2018    HCT 38.9 (L) 06/27/2018    MCV 86 06/27/2018     06/27/2018     No results for input(s): INR in the last 168 hours.    Pertinent Radiology  Radiology Results: images and  radiology report reviewed  Recent Results (from the past 24 hour(s))   Pelvis XR w/ unilateral hip left    Narrative    PELVIS WITH LEFT HIP LATERAL TWO VIEWS  6/26/2018 6:27 PM     HISTORY: Hip pain, jump from ladder.     COMPARISON: None.      Impression    IMPRESSION: There is a probable fracture through the left femoral neck  which probably extends into the intertrochanteric region. If  indicated, CT might be helpful in further evaluation.    SOWMYA CAT MD   Lumbar spine XR, 2-3 views    Narrative    LUMBAR SPINE TWO TO THREE VIEWS 6/26/2018 8:38 PM     HISTORY: Fall from ladder, left hip fracture.     COMPARISON: None.      Impression    IMPRESSION: Five functional lumbar vertebral segments in addition to a  probable transitional lumbosacral level. Normal alignment. No  significant disc space narrowing. No acute bony abnormality. Vascular  calcifications are seen.    MINDY JENNINGS MD       Attestation:  I have reviewed today's vital signs, notes, medications, labs and imaging.  Amount of time performed on this consult: 25 minutes.     Leslye Cruz

## 2018-06-27 NOTE — OP NOTE
Procedure Date: 06/27/2018      PREOPERATIVE DIAGNOSIS:  Closed impacted left femoral neck fracture.      POSTOPERATIVE DIAGNOSIS:  Closed impacted left femoral neck fracture.      PROCEDURE:  Percutaneous screw fixation x 3, left femoral neck fracture.      SURGEON:  Flash Tim MD      ASSISTANT:  Declan Chen PA-C      ANESTHESIA:  General endotracheal.      ESTIMATED BLOOD LOSS:  Minimal.      COMPLICATIONS:  None apparent skin.      INDICATIONS:  Florencio is a 63-year-old male who took a fall on the evening of 06/26/2018 and sustained a left closed femoral neck fracture.  Alternatives, risks, and possible complications were extremely carefully discussed prior to obtaining operative consent.      OPERATION:  The patient was brought to main operating room.  After general anesthesia was obtained, positioned supine on the fracture table.  Then 2 grams of Ancef was given intravenously.  Left lower extremity was prepped and draped in usual sterile fashion and fluoroscopic visualization of the fracture which verified to be anatomically position.      A small incision was made over the lateral aspect of the cortex of the skin just distal to the greater trochanter.  Subcutaneous tissue was divided sharply.  I then placed 3 Steinmann pins under fluoroscopic guidance in a triangular fashion to the femoral head.  I then carefully measured them and placed partially threaded titanium screws, again just staying shy of the subchondral plate.  Fluoroscopy was used to verify anatomic position of the fracture as well as stable placement of the hardware.  Pins were subsequently removed and the wound was irrigated out.  I then closed the subcutaneous tissue with 2-0 Vicryl suture and completed closure with 3-0 Stratafix.  Sterile bandage was applied.  The patient was awakened and returned to [facility] in stable condition, without apparent complication.         FLASH TIM MD             D: 06/27/2018   T: 06/27/2018   MT: JAQUELIN       Name:     GIANFRANCO REAVES   MRN:      [id number]        Account:        [id number]   :      1955           Procedure Date: 2018      Document: [id number]       cc: Donnie Tim MD

## 2018-06-27 NOTE — ED NOTES
"Patient has  [facility] to Observation  order. Patient has been given the Observation brochure -  What does Observation mean to me.\"  Patient has been given the opportunity to ask questions about observation status and their plan of care.      Jackie Marquis  "

## 2018-06-27 NOTE — PROGRESS NOTES
[facility]    Hospital Medicine Progress Note  Date of Service: 06/27/2018    Assessment & Plan   Calos Callahan is a 63 year old male with a past medical history significant for known aortic valve stenosis, hypertension, dyslipidemia, type 2 diabetes mellitus, anxiety & depression, known pulmonary nodules, erectile dysfunction, and tobacco dependence who presents on 6/26/2018 with left hip pain after fall.    Fracture of femoral neck, left (H)  Occurred from falling off ladder. X-ray pelvis and hip showied left femoral neck fracture, probably extending to intertrochanteric region.  Is S/P percutaneous screw fixation x3, Dr. Tim, today is POD #0.  - Analgesia, DVT prophylaxis, activity per Orthopedics.        Aortic valve stenosis  Bicuspid aortic valve  Previously diagnosed. Follows with Dr. Douglas at [facility]. No history of valve surgery, it was not recommended after patient's last visit in January 2018. Yearly echocardiograms, most recently in Jan 2018:  1. Normal left ventricular chamber size. Normal left ventricular systolic function. No regional wall motion abnormalities.  Moderate concentric increase in left ventricular wall thickness.  Estimated left ventricular ejection fraction is 65-70%.   2. Normal right ventricular chamber size. Normal right ventricular systolic function.   3. Bicuspid aortic valve. Diffuse thickening and calcification of the aortic valve cusps with   reduced excursion. Moderate-to-severe aortic stenosis. Mean transvalvular gradient is 35.1 mmHg. This is probably being underestimated secondary to technical issues. The aortic valve area is 1.2 cm2 and the dimensionless index is 0.3.   4. Mildly dilated ascending aorta (4.0cm).   5. When compared to the previous echocardiographic images of 02/08/2017, there has been no   significant change. The mean gradeint is lower though this is probably related to technical reasons rather then a true clinical  chnage.    No prior to admission problems with angina, syncope/presyncope, or CHF. No evidence of CHF currently.  - Observe.    - Continue usual lisinopril/HCTZ.    Leukocytosis  Presented with WBC 17.2, left shift. Afebrile. No evidence for infection. Likely stress response.  - WBC normal this AM.  Problem resolved.     Hypokalemia  Presented with potassium 3.3, improved to 3.7 after replacement.  - Continue K protocol.     Long-term insulin use in type 2 diabetes (H)  Presented with glucose 62. Asymptomatic. Most recent HgbA1c 6.7. Taking metformin, Actos, Glyxambi, and Lantus (42 U q hs) to manage.  - Continue Lantus 42 U q hs  - Diet has been resumed, so will resume metformin, Actos, and Glyxambi.  - Medium dose sliding scale insulin in place.  - Hyper/hypoglycemia protocol in place.        Benign essential hypertension  Good BP control here.  - Continue lisinopril-hydrochlorothiazide as prior to admission.        Mixed hyperlipidemia  Taking Lipitor prior to admission, continue.        Adjustment disorder with mixed anxiety and depressed mood  Taking Celexa prior to admission, continue.        Tobacco dependence syndrome  Daily cigar use, trying to quit. Was prescribed Chantix last week but has not yet started. Declines nicotine replacement at this time.     ED (erectile dysfunction)  Taking prn Viagra prior to admission, hold.     Pulmonary nodules  Noted in 2014, had repeat CT in 2016. Appears to be overdue for repeat imaging.  - Defer ongoing monitoring to outpatient.        Fluids: LR @ 75  Electrolytes: K protocol  Nutrition: Consistent carbohydrate.     DVT Prophylaxis: Per Orthopedics.  Code Status: Full Code.     Lines: Peripheral  Bose catheter: Not indicated     Disposition: Anticipate discharge in 1- 2 day(s). Appropriate for Inpatient care.     Discussion: Appears comfortable and stable in the immediate post-op period.    Attestation:  I have reviewed today's vital signs, notes, medications, labs  "and imaging.  Amount of time performed on this follow-up visit: 35 minutes, 20 of which were spent in care coordination and counseling with the patient and his wife.    Melvin Rajput MD      Interval History   Doing \"great\".  Pain control good.  Is hungry, no nausea, emesis, or abdominal pain.  Denies dyspnea, chest pain.    Physical Exam   Temp:  [97.3  F (36.3  C)-100  F (37.8  C)] 97.3  F (36.3  C)  Pulse:  [57-78] 57  Heart Rate:  [] 90  Resp:  [12-20] 12  BP: ()/(54-81) 121/70  SpO2:  [89 %-99 %] 99 %    Weights:   Vitals:    06/26/18 1731   Weight: 94.3 kg (208 lb)    Body mass index is 27.44 kg/(m^2).    GENERAL: Pleasant man, lying in bed, looks comfortable.  EYES: Eyes grossly normal to inspection, extraocular movements intact  HENT: Nares patent bilaterally.  Nasal mucosa normal, no discharge.    NECK: Trachea midline, no stridor.   RESP: No accessory muscle use.  Symmetrical breath sounds.  Lungs clear throughout on inspiration and expiration.  Expiration not prolonged, no wheeze.  CV: Regular rate and rhythm, non-tachycardic.  Normal S1 S2, grade III/VI midsystolic murmur heard best over upper sternal borders, no extra sound.  No lower extremity edema.  ABDOMEN: Soft, non-tender, no guarding.  Liver and spleen not enlarged, no masses palpable.  Bowel sounds positive.  MS: No bony deformities noted.  No red or inflamed joints.  Operative site not examined.  SKIN: Warm and dry, no rashes where skin visible.  NEURO: Alert, oriented, conversant.  Cranial nerves II - XII grossly intact.  No gross motor or sensory deficits.    PSYCH: Calm, alert, conversant.  Able to articulate logical thoughts, no tangential thoughts, no hallucinations or delusions.  Affect normal.        Data     Recent Labs  Lab 06/27/18  0916 06/27/18  0644 06/26/18  1905   WBC  --  10.9 17.2*   HGB  --  12.4* 13.5   MCV  --  86 85   PLT  --  162 222   NA  --   --  135   POTASSIUM 3.7  --  3.3*   CHLORIDE  --   --  103 "   CO2  --   --  28   BUN  --   --  28   CR  --   --  0.97   ANIONGAP  --   --  4   JAMARCUS  --   --  8.9   GLC  --   --  62*         Recent Labs  Lab 06/27/18  1145 06/27/18  0631 06/27/18  0207 06/26/18  2146 06/26/18  1905   GLC  --   --   --   --  62*   * 119* 100* 114*  --         Unresulted Labs Ordered in the Past 30 Days of this Admission     No orders found for last 61 day(s).           Imaging  Recent Results (from the past 24 hour(s))   Pelvis XR w/ unilateral hip left    Narrative    PELVIS WITH LEFT HIP LATERAL TWO VIEWS  6/26/2018 6:27 PM     HISTORY: Hip pain, jump from ladder.     COMPARISON: None.      Impression    IMPRESSION: There is a probable fracture through the left femoral neck  which probably extends into the intertrochanteric region. If  indicated, CT might be helpful in further evaluation.    SOWMYA CAT MD   Lumbar spine XR, 2-3 views    Narrative    LUMBAR SPINE TWO TO THREE VIEWS 6/26/2018 8:38 PM     HISTORY: Fall from ladder, left hip fracture.     COMPARISON: None.      Impression    IMPRESSION: Five functional lumbar vertebral segments in addition to a  probable transitional lumbosacral level. Normal alignment. No  significant disc space narrowing. No acute bony abnormality. Vascular  calcifications are seen.    MINDY JENNINGS MD   XR Surgery OFE L/T 5 Min Fluoro w Stills    Narrative    SURGERY C-ARM FLUOROSCOPY LESS THAN FIVE MINUTES WITH STILLS 6/27/2018  10:30 AM     COMPARISON: 6/26/2018    HISTORY: ORIF Hip nailing.     NUMBER OF IMAGES ACQUIRED: 2    VIEWS: 2    FLUOROSCOPY TIME: .59      Impression    IMPRESSION: Intraoperative views during placement of three  percutaneous screws across a left femoral neck fracture.    ESTEBAN VALDEZ MD        I reviewed all new labs and imaging results over the last 24 hours. I personally reviewed no images or EKG's today.    Medications     dextrose 5% and 0.45% NaCl + KCl 20 mEq/L 100 mL/hr at 06/27/18 1333       acetaminophen   975 mg Oral Q8H     baclofen  10 mg Oral TID     ceFAZolin  2 g Intravenous Q8H     citalopram  40 mg Oral Daily     [START ON 6/28/2018] enoxaparin  40 mg Subcutaneous Q24H     gabapentin  300 mg Oral BID     lisinopril  40 mg Oral Daily    Or     hydrochlorothiazide  25 mg Oral Daily     insulin aspart  1-7 Units Subcutaneous TID AC     insulin aspart  1-5 Units Subcutaneous At Bedtime     insulin glargine  21 Units Subcutaneous QAM AC     senna-docusate  1 tablet Oral BID    Or     senna-docusate  2 tablet Oral BID     sodium chloride (PF)  3 mL Intracatheter Q8H       Melvin Rajput MD

## 2018-06-27 NOTE — PLAN OF CARE
Problem: Fracture Orthopaedic (Adult)  Goal: Signs and Symptoms of Listed Potential Problems Will be Absent, Minimized or Managed (Fracture Orthopaedic)  Signs and symptoms of listed potential problems will be absent, minimized or managed by discharge/transition of care (reference Fracture Orthopaedic (Adult) CPG).   Outcome: Improving  Patient voided once after surgery; then tried to void x 2 after that without success.  Bladder scanned for 829 cc.  Assisted patient to stand at side of bed and then pivoted into chair; NWB on LEFT LOWER EXTREMITY with gait belt and walker.  Patient was then able to void 675 cc clear, dark yellow urine.    Attempted to remove oxygen and patient desats to 84-85 % room air.  Re-applied oxygen 2 LPM via NC and encouraged cough, deep breathing and IS.  Pulls IS to [phone].

## 2018-06-27 NOTE — ANESTHESIA POSTPROCEDURE EVALUATION
Patient: Calos Callahan    Procedure(s):  Perc Pinning Left Hip - Wound Class: I-Clean    Diagnosis:hip fracture  Diagnosis Additional Information: No value filed.    Anesthesia Type:  General, ETT, LMA    Note:  Anesthesia Post Evaluation    Patient location during evaluation: Bedside  Patient participation: Able to fully participate in evaluation  Level of consciousness: awake and alert  Pain management: adequate  Airway patency: patent  Cardiovascular status: acceptable  Respiratory status: acceptable  Hydration status: acceptable  PONV: none     Anesthetic complications: None          Last vitals:  Vitals:    06/27/18 1352 06/27/18 1437 06/27/18 1515   BP:  102/61 121/70   Pulse:   57   Resp:   12   Temp:   36.3  C (97.3  F)   SpO2: 94%  99%         Electronically Signed By: ADRIÁN Cerna CRNA  June 27, 2018  3:19 PM

## 2018-06-27 NOTE — H&P
[facility]    History and Physical  Hospital Medicine       Date of Admission:  6/26/2018  Date of Service: 6/26/2018     CC: fall, left hip pain    Assessment & Plan   Calos Callahan is a 63 year old male with a past medical history significant for known aortic valve stenosis, hypertension, dyslipidemia, type 2 diabetes mellitus, anxiety & depression, known pulmonary nodules, erectile dysfunction, and tobacco dependence who presents on 6/26/2018 with left hip pain after fall.      Fracture of femoral neck, left (H)  Occurred from falling off ladder. X-ray pelvis and hip showing left femoral neck fracture, probably extends to intertrochanteric region. Will need surgery. Case discussed between emergency department provider and on-call ortho, plans for surgery 6/27/18.  - Pain control  - NPO at midnight  - IV fluids  - Ortho consult, plans for surgery 6/27/18  - Surgical clearance as below  - Patient has itching after starting dilaudid, prn hydroxyzine available    Surgery Clearance and RCRI Risk Assessment:    Anesthesia issues: No history of any issues  Baseline Activity: > 4 METS  Chest Pain: Denies  Shortness of breath: Denies  Cardiac Risk Factors/Assessment:                High Risk Surgery: No              History Ischemic Heart Disease: No              History of Congestive Heart Failure: No              History of CVA: No              Preoperative Treatment with Insulin: Yes              Preoperative Creatinine greater than 2.0: No              Total Number of Points: 1 = 0.9% risk of major cardiac event  Patient has known aortic stenosis (see below), making him a higher risk for surgery. Denies any chest pain, dizziness, syncope, or other evidence of symptoms related to aortic stenosis. Follows with cardiology - see below.    Patient at higher risk for surgery due to aortic stenosis, but follows regularly with cardiology, recent echo in Jan 2018. No modifiable risk factors prior  to surgery, patient can proceed with surgery.      Fall from ladder, initial encounter  Fall from 5 feet off of a ladder, landed on left outstretched leg. Loss of balance, no weakness, syncope, dizziness associated with fall. No head trauma or loss of consciousness. Complains of some low back pain. No knee, foot, or ankle pain. Shallow superficial abrasions present on left arm. Does not meet criteria for trauma work-up or consult per emergency department provider.  - Lumbar spine x-ray negative for acute fracture or injury  - No x-ray indicated for left knee, ankle, or foot at this time      Aortic valve stenosis  Bicuspid aortic valve  Previously diagnosed. Follows with Dr. Douglas at [facility]. No history of valve surgery, it was not recommended after patient's last visit in January 2018. Yearly echocardiograms, most recently in Jan 2018:  1. Normal left ventricular chamber size. Normal left ventricular systolic function. No regional wall motion abnormalities.  Moderate concentric increase in left ventricular wall thickness.  Estimated left ventricular ejection fraction is 65-70%.   2. Normal right ventricular chamber size. Normal right ventricular systolic function.   3. Bicuspid aortic valve. Diffuse thickening and calcification of the aortic valve cusps with   reduced excursion. Moderate-to-severe aortic stenosis. Mean transvalvular gradient is 35.1 mmHg. This is probably being underestimated secondary to technical issues. The aortic valve area is 1.2 cm2 and the dimensionless index is 0.3.   4. Mildly dilated ascending aorta (4.0cm).   5. When compared to the previous echocardiographic images of 02/08/2017, there has been no   significant change. The mean gradeint is lower though this is probably related to technical reasons rather then a true clinical chnage.      Leukocytosis  Presented with WBC 17.2, left shift. Afebrile. No evidence for infection. Likely stress response.  - Recheck in  am    Hypokalemia  Presented with potassium 3.3.  - K protocol    Long-term insulin use in type 2 diabetes (H)  Presented with glucose 62. Asymptomatic. Most recent HgbA1c 6.7. Taking metformin, Actos, Glyxambi, and Lantus (42 U q hs) to manage.  - Continue Lantus 42 U q hs  - Hold metformin, Actos, and Glyxambi for now  - Medium dose sliding scale insulin  - Hyper/hypoglycemia protocol      Benign essential hypertension  Pressures reviewed and stable. Taking lisinopril-hydrochlorothiazide prior to admission, continue.      Mixed hyperlipidemia  Taking Lipitor prior to admission, continue.      Adjustment disorder with mixed anxiety and depressed mood  Taking Celexa prior to admission, continue.      Tobacco dependence syndrome  Daily cigar use, trying to quit. Was prescribed Chantix last week but has not yet started. Declines nicotine replacement at this time.    ED (erectile dysfunction)  Taking prn Viagra prior to admission, hold.    Pulmonary nodules  Noted in 2014, had repeat CT in 2016. Appears to be overdue for repeat imaging.  - Defer to pcp for ongoing monitoring      Fluids: LR @ 75  Electrolytes: K protocol  Nutrition: Consistent carbohydrate, NPO midnight    DVT Prophylaxis: Pneumatic Compression Devices  Code Status: Full Code - discussed directly with patient    Lines: Peripheral  Bose catheter: Not indicated    Disposition: Anticipate discharge in 1- 2 day(s). Appropriate for observation care.    Discussion: Sign out from emergency department discussed with Dr. Martin. Assessment and plan discussed with Dr. Ban Babb.    Kerire Grullon PA-C  [facility] Hospitalist Service  Pager: [phone]          Primary Care Physician   Semmler, Steven Duane [phone]    History is obtained from the patient and review of old records via the EMR.    Past Medical History      Past Medical History:   Diagnosis Date     Depressive disorder      Diabetes (H)      Hypertension      Murmur           Diagnosis Date Noted     Mixed hyperlipidemia 06/26/2018     Priority: Medium     Benign essential hypertension 06/26/2018     Priority: Medium     Aortic valve stenosis 06/26/2018     Priority: Medium     Follows with [facility]. Douglas at [facility]. Gets yearly echocardiograms. No indication of valve surgery at this time.  Most recent echo Jan 2018  1. Normal left ventricular chamber size. Normal left ventricular systolic function. No   regional wall motion abnormalities.  Moderate   concentric increase in left ventricular wall thickness.  Estimated left ventricular ejection   fraction is 65-70%.   2. Normal right ventricular chamber size. Normal right ventricular systolic function.   3. Bicuspid aortic valve. Diffuse thickening and calcification of the aortic valve cusps with   reduced excursion. Moderate-to-severe   aortic stenosis. Mean transvalvular gradient is 35.1 mmHg. This is probably being   underestimated secondary to technical issues. The   aortic valve area is 1.2 cm2 and the dimensionless index is 0.3.   4. Mildly dilated ascending aorta (4.0cm).   5. When compared to the previous echocardiographic images of 02/08/2017, there has been no   significant change. The mean gradeint   is lower though this is probably related to technical reasons rather then a true clinical   chnage.       Long-term insulin use in type 2 diabetes (H) 06/26/2018     Priority: Medium     ED (erectile dysfunction) 06/26/2018     Priority: Medium     Bicuspid aortic valve 06/26/2018     Priority: Medium     Pulmonary nodules 06/26/2018     Priority: Medium     First noted 2014. Repeat CT in 2016.       Adjustment disorder with mixed anxiety and depressed mood 06/26/2018     Priority: Medium     Tobacco dependence syndrome 06/26/2018     Priority: Medium        Past Surgical History     Past Surgical History:   Procedure Laterality Date     basal cell - face       SHOULDER SURGERY       VASECTOMY          History of Present Illness    Calos Callahan is a 63 year old male with the above past medical history now presents on 6/26/2018 after falling 5 feet from a ladder. Patient lost his balance and fell, denies dizziness, weakness, or syncope. Landed on his left foot with his leg outstretched, then rolled. Had instant left hip pain after fall, unable to bear weight. Also scraped his left arm and has some low back pain.    No knee, ankle, or foot pain. Denies any new numbness or tingling after injury. Denies hitting his head or loss of consciousness.    Prior to fall had been at his baseline health. Has recently lost about 15 pounds since starting a job requiring more physical labor. Denies any recent illness, fever, chills, appetite changes, sleep disturbance, chest pain, palpitations, edema, cough, wheeze, shortness of breath, abdominal pain, nausea, vomiting, diarrhea, constipation, melena, hematochezia, dysuria, weakness, skin changes or rash, pain other than previously mentioned, headache, vision changes, dizziness, lightheadedness, syncope, numbness, tingling, generalized weakness, confusion, or slurred speech.      Prior to Admission Medications   Prior to Admission Medications   Prescriptions Last Dose Informant Patient Reported? Taking?   ASPIRIN PO 6/26/2018 at am Self Yes Yes   Sig: Take 81 mg by mouth daily    Atorvastatin Calcium (LIPITOR PO) 6/26/2018 at am Self Yes Yes   Sig: Take 40 mg by mouth daily   IBUPROFEN PO 6/26/2018 at pm Self Yes Yes   Sig: Take 800 mg by mouth as needed for moderate pain   METFORMIN HCL PO 6/26/2018 at am Self Yes Yes   Sig: Take 2,000 mg by mouth daily (with breakfast)    Pioglitazone HCl (ACTOS PO) 6/26/2018 at am Self Yes Yes   Sig: Take 15 mg by mouth daily    Sildenafil Citrate (VIAGRA PO) 0 in 24 hours at Unknown time Self Yes No   Sig: Take 100 mg by mouth   acyclovir (ZOVIRAX) 5 % cream More than a month at Unknown time Self Yes No   Sig: Apply topically 5 times daily As needed for cold sores    blood glucose monitoring (CONTOUR NEXT TEST) test strip Past Week at Unknown time Self Yes Yes   Sig: by In Vitro route 2 times daily   citalopram (CELEXA) 40 MG tablet 6/26/2018 at am Self Yes Yes   Sig: Take 40 mg by mouth daily   empagliflozin-linagliptin (GLYXAMBI) 10-5 MG TABS per tablet 6/26/2018 at am Self Yes Yes   Sig: Take 1 tablet by mouth every morning   insulin glargine (LANTUS) 100 UNIT/ML injection 6/26/2018 at am Self Yes Yes   Sig: Inject 42 Units Subcutaneous every morning    lisinopril-hydrochlorothiazide (PRINZIDE/ZESTORETIC) 20-12.5 MG per tablet 6/26/2018 at am Self Yes Yes   Sig: Take 2 tablets by mouth daily   valACYclovir (VALTREX) 1000 mg tablet More than a month at Unknown time Self Yes No   Sig: Take 2 tablets by mouth 2 times daily as needed For cold sores   varenicline (CHANTIX MEDINA) 0.5 MG X 11 & 1 MG X 42 tablet new not yet Self Yes Yes   Sig: Days 1-3 take 0.5mg once daily; Days 4-7 take 0.5mg twice daily; then increase to 1mg twice daily. Take with meals.   varenicline (CHANTIX) 1 MG tablet new not yet Self Yes Yes   Sig: Take 1 mg by mouth 2 times daily (with meals)      Facility-Administered Medications: None     Allergies   No Known Allergies    Family History    Family History   Problem Relation Age of Onset     Diabetes Mother      Emphysema Mother      Coronary Artery Disease Father      KIDNEY DISEASE Father      No Known Problems Daughter        Social History   Social History     Social History     Marital status:      Spouse name: N/A     Number of children: N/A     Years of education: N/A     Occupational History     retired      Social History Main Topics     Smoking status: Light Tobacco Smoker     Packs/day: 0.25     Smokeless tobacco: Never Used      Comment: cigars, quitting     Alcohol use Yes      Comment: 3-4 drinks on the weekends     Drug use: No     Sexual activity: Yes     Partners: Female     Birth control/ protection: None     Other Topics Concern  "    Not on file     Social History Narrative     No narrative on file       Review of Systems     A complete 10 point review of systems was negative except for items noted in the HPI/subjective.      Physical Exam   /57 (BP Location: Left arm)  Pulse 78  Temp 98.2  F (36.8  C) (Oral)  Resp 16  Ht 1.854 m (6' 1\")  Wt 94.3 kg (208 lb)  SpO2 91%  BMI 27.44 kg/m2     Weight: 208 lbs 0 oz Body mass index is 27.44 kg/(m^2).     Constitutional: Alert, oriented, cooperative, no apparent distress, appears nontoxic, speaking in full sentences.     Eyes: Eyes are clear, pupils are reactive. No scleral icterus. Extroccular movements intact.     HEENT: Oropharynx is clear and moist, no lesions. Normocephalic, no evidence of cranial trauma.        Cardiovascular: Regular rhythm and rate, normal S1 and S2. Known blowing systolic murmur. No rubs or gallops. Peripheral pulses in tact bilaterally. No lower extremity edema.    Respiratory: Lung sounds are clear to auscultation bilaterally without wheezes, rhonchi, or crackles.    GI: Soft, non-distended. Non-tender, no rebound or guarding. No hepatosplenomegaly or masses appreciated. Normal bowel sounds.     Musculoskeletal: Without obvious deformity other than some swelling and tenderness at left hip. Normal range of motion of upper extremities and right lower extremity - ROM of left lower extremity not assessed. Normal muscle bulk and tone. Distal CMS intact.     Skin: Warm and dry, no rashes or ecchymoses. No mottling of skin. Superficial abrasions present on left elbow and arm. Bruising at left hip.     Neurologic: Patient moves all extremities. Cranial nerves are grossly intact.  is symmetric. Gross strength and sensation are equal bilaterally.    Genitourinary: Deferred    Data   Data reviewed today:     Recent Labs  Lab 06/26/18  1905   WBC 17.2*   HGB 13.5   MCV 85         POTASSIUM 3.3*   CHLORIDE 103   CO2 28   BUN 28   CR 0.97   ANIONGAP 4 "   JAMARCUS 8.9   GLC 62*       Recent Results (from the past 24 hour(s))   Pelvis XR w/ unilateral hip left    Narrative    PELVIS WITH LEFT HIP LATERAL TWO VIEWS  6/26/2018 6:27 PM     HISTORY: Hip pain, jump from ladder.     COMPARISON: None.      Impression    IMPRESSION: There is a probable fracture through the left femoral neck  which probably extends into the intertrochanteric region. If  indicated, CT might be helpful in further evaluation.    SOWMYA CAT MD   Lumbar spine XR, 2-3 views    Narrative    LUMBAR SPINE TWO TO THREE VIEWS 6/26/2018 8:38 PM     HISTORY: Fall from ladder, left hip fracture.     COMPARISON: None.      Impression    IMPRESSION: Five functional lumbar vertebral segments in addition to a  probable transitional lumbosacral level. Normal alignment. No  significant disc space narrowing. No acute bony abnormality. Vascular  calcifications are seen.       I personally reviewed no images or EKG's today.    Kerrie Grullon PA-C  [facility] Hospitalist Service  Pager: [phone]

## 2018-06-27 NOTE — ANESTHESIA CARE TRANSFER NOTE
Patient: Calos Callahan    Procedure(s):  Perc Pinning Left Hip - Wound Class: I-Clean    Diagnosis: hip fracture  Diagnosis Additional Information: No value filed.    Anesthesia Type:   General, ETT, LMA     Note:  Airway :Face Mask and Nasopharyngeal Airway  Patient transferred to:PACU  Comments: Patient's VSS. Spontaneous respirations. Pt asleep.  IV patent. Report to GLORIA Tipton.Handoff Report: Identifed the Patient, Identified the Reponsible Provider, Reviewed the pertinent medical history, Discussed the surgical course, Reviewed Intra-OP anesthesia mangement and issues during anesthesia, Set expectations for post-procedure period and Allowed opportunity for questions and acknowledgement of understanding      Vitals: (Last set prior to Anesthesia Care Transfer)    CRNA VITALS  6/27/2018 1022 - 6/27/2018 1059      6/27/2018             Pulse: 115    SpO2: 98 %                Electronically Signed By: ADRIÁN Cerna CRNA  June 27, 2018  10:59 AM

## 2018-06-27 NOTE — CONSULTS
Consult Date:  06/27/2018      CHIEF COMPLAINT:  Left hip pain.      HISTORY:  Florencio is a 63-year-old male I was asked to see here by the Emergency Room for urgent evaluation of a left femoral neck fracture he sustained on 06/26/2018.  Apparently, he was at work standing on a ladder, the ladder started coming out from underneath him and he jumped.  Apparently, he landed wrong on the hip immediately sustaining significant pain.  He was able to hobble to the car and drive to the Emergency Room where he was evaluated.      PAST MEDICAL HISTORY:  Notable for adult onset diabetes for many years.  He notes the hemoglobin A1c was somewhere in the low 7s.  He is on metformin for this as well as Actos.  The patient also has hypertension and is treated for this as well as hypercholesterolemia.      SOCIAL HISTORY:  He is , has a daughter living locally.  He is mild smoker and notes he just restarted.  We did discuss smoking cessation.  Minimal drinker.  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        MEDICATIONS:  Were as listed.      ALLERGIES:  NO KNOWN DRUG ALLERGIES.      REVIEW OF SYSTEMS:  Notable for the left hip as well as some scrapes on the left arm.      FAMILY HISTORY:  Otherwise noncontributory.        PHYSICAL EXAMINATION:     GENERAL:  Pleasant, appropriately muscular male appearing of stated age.   SKIN:  Intact.  Abrasions were noted on the left arm.   MUSCULOSKELETAL:  I did not do range of motion, strength test secondary to fracture.  However, neurovascularly intact left lower extremity.        X-RAYS:  X-rays reveal an impacted left Garden 1 femoral neck fracture.      ASSESSMENT:   1.  Closed left Garden 1 femoral neck fracture with impaction.   2.  History insulin-dependent diabetes.   3.  History of mild smoker.      PLAN:  Discussed with Florencio options regards to treatment.  He does have some very mild HUMBLE-type anatomy of the hip but has not had any  prefracture hip symptoms; therefore, he agreed to go forward with percutaneous screw fixation.  Carefully discussed risks, complications and especially in the face of diabetes and smoking and he is going to have to remain nonweightbearing strictly for at least 6 weeks, though it could be extended.  I also discussed failure of the hardware, failure of fracture healing and requiring a total hip arthroplasty and he is understanding of that; therefore, proceed with surgical intervention.         DONNIE HORNE MD             D: 2018   T: 2018   MT: JAQUELIN      Name:     GIANFRANCO REAVES   MRN:      [id number]        Account:       [id number]   :      1955           Consult Date:  2018      Document: [id number]       cc: Donnie Mccoy MD

## 2018-06-27 NOTE — PROGRESS NOTES
Secondary skin check completed and no skin concerns. Pt refused to have underwear removed and to have jim-area or coccyx check done.

## 2018-06-27 NOTE — ANESTHESIA PREPROCEDURE EVALUATION
Anesthesia Evaluation     .             ROS/MED HX    ENT/Pulmonary:     (+)tobacco use, Current use , . Other pulmonary disease pulmonary nodules.    Neurologic:       Cardiovascular:     (+) Dyslipidemia, hypertension----. : . . . :. valvular problems/murmurs type: AS bicuspid AV; mod-sev AS:. Previous cardiac testing Echodate:1/2018results:1. Normal left ventricular chamber size. Normal left ventricular systolic function. No regional wall motion abnormalities.  Moderate concentric increase in left ventricular wall thickness.  Estimated left ventricular ejection fraction is 65-70%.   2. Normal right ventricular chamber size. Normal right ventricular systolic function.   3. Bicuspid aortic valve. Diffuse thickening and calcification of the aortic valve cusps with   reduced excursion. Moderate-to-severe aortic stenosis. Mean transvalvular gradient is 35.1 mmHg. This is probably being underestimated secondary to technical issues. The aortic valve area is 1.2 cm2 and the dimensionless index is 0.3.   4. Mildly dilated ascending aorta (4.0cm).   5. When compared to the previous echocardiographic images of 02/08/2017, there has been no   significant change. The mean gradeint is lower though this is probably related to technical reasons rather then a true clinical chnage.date: results:ECG reviewed date:6/27/18 results:Sinus Rhythm date: results:          METS/Exercise Tolerance:     Hematologic:         Musculoskeletal:   (+) , fracture lower extremity: Hip (Left), -       GI/Hepatic:         Renal/Genitourinary:         Endo:     (+) type II DM Last HgA1c: 6.7 .      Psychiatric:     (+) psychiatric history depression      Infectious Disease:         Malignancy:         Other: Comment: Results for GIANFRANCO REAVES (MRN [id number]) as of 6/27/2018 09:23    6/27/2018 06:31  Glucose: 119 (H)    6/27/2018 06:44  WBC: 10.9  Hemoglobin: 12.4 (L)  Hematocrit: 38.9 (L)  Platelet Count: 162  RBC Count: 4.51  MCV: 86  MCH:  27.5  MCHC: 31.9  RDW: 13.6                                     Anesthesia Plan      History & Physical Review  History and physical reviewed and following examination; no interval change.    ASA Status:  3 .    NPO Status:  > 8 hours    Plan for General, ETT and LMA with Intravenous and Propofol induction. Maintenance will be Balanced.    PONV prophylaxis:  Ondansetron (or other 5HT-3) and Dexamethasone or Solumedrol       Postoperative Care  Postoperative pain management:  IV analgesics and Oral pain medications.      Consents  Anesthetic plan, risks, benefits and alternatives discussed with:  Patient.  Use of blood products discussed: No .   .                          .

## 2018-06-27 NOTE — PLAN OF CARE
"Problem: Patient Care Overview  Goal: Plan of Care/Patient Progress Review  [place] [facility] [place] ADMISSION NOTE    Patient admitted to room 2307 at approximately 2100 via cart from emergency room. Patient was accompanied by spouse, transport tech and daughter.     Verbal SBAR report received from GLORIA Lee prior to patient arrival.     Patient trasferred to bed via air martha. Patient alert and oriented X 3. Pain is controlled with current analgesics.  Medication(s) being used: narcotic analgesics including hydromorphone (Dilaudid). 0-10 Pain Scale: 9. Admission vital signs: Blood pressure 112/57, pulse 78, temperature 98.2  F (36.8  C), temperature source Oral, resp. rate 16, height 1.854 m (6' 1\"), weight 94.3 kg (208 lb), SpO2 91 %. Patient and spouse were oriented to plan of care, call light, bed controls, tv, telephone, bathroom and visiting hours.     Risk Assessment    The following safety risks were identified during admission: fall. Yellow risk band applied: YES.     Skin Initial Assessment    This writer admitted this patient and completed a full skin assessment and Satnam score in the Adult PCS flowsheet. Appropriate interventions initiated as needed.     Secondary skin check completed by Mar Vargas RN-Charge.    Skin  Inspection of bony prominences: Full  Skin WDL: WDL    Satnam Risk Assessment  Sensory Perception: 4-->no impairment  Moisture: 4-->rarely moist  Activity: 1-->bedfast  Mobility: 3-->slightly limited  Nutrition: 3-->adequate  Friction and Shear: 3-->no apparent problem  Satnam Score: 18  Bed Support Surface: Atmos Air mattress  Satnam Intervention(s) Implemented: draw sheets, HOB elevated 30 degrees or less, turn: micro-turn (less than 30 degrees)    Melba Cook        "

## 2018-06-27 NOTE — PLAN OF CARE
Problem: Patient Care Overview  Goal: Plan of Care/Patient Progress Review  Outcome: No Change  [facility] [facility] TRANSPORT NOTE  Data:   Reason for Transport:  To Surgery room 5.    Calos Callahan was transported from Med/Surg room 2307 via bed at 0930.  Patient was accompanied by Nursing Assistant x 2 and spouse Nicky and daughter. Equipment used for transport: None. Family was aware of reason for transport: yes.    Action:  Report: given to Brynn Espinosa RN.    Response:  Patient's condition when transferred off unit was stable.    Ryann Disla

## 2018-06-27 NOTE — PLAN OF CARE
Problem: Fracture Orthopaedic (Adult)  Goal: Signs and Symptoms of Listed Potential Problems Will be Absent, Minimized or Managed (Fracture Orthopaedic)  Signs and symptoms of listed potential problems will be absent, minimized or managed by discharge/transition of care (reference Fracture Orthopaedic (Adult) CPG).   Outcome: Improving  [place] [facility] TRANSPORT NOTE  Data:   Reason for Transport:  Return to med/surg room 2307.    Calos Callahan was transported from PACU via bed at 1255.  Patient was accompanied by Transport Aide. Equipment used for transport: None. Family was aware of reason for transport: yes    Action:  Report: received from Danuta MORGAN RN.    Response:  Patient's condition upon return was stable.  Patient is alert and oriented x 4.  Denies pain or nausea.    Ryann Disla

## 2018-06-28 ENCOUNTER — APPOINTMENT (OUTPATIENT)
Dept: OCCUPATIONAL THERAPY | Facility: CLINIC | Age: 63
DRG: 481 | End: 2018-06-28
Payer: OTHER MISCELLANEOUS

## 2018-06-28 ENCOUNTER — APPOINTMENT (OUTPATIENT)
Dept: PHYSICAL THERAPY | Facility: CLINIC | Age: 63
DRG: 481 | End: 2018-06-28
Payer: OTHER MISCELLANEOUS

## 2018-06-28 LAB
ANION GAP SERPL CALCULATED.3IONS-SCNC: 6 MMOL/L (ref 3–14)
BUN SERPL-MCNC: 31 MG/DL (ref 7–30)
CALCIUM SERPL-MCNC: 8.6 MG/DL (ref 8.5–10.1)
CHLORIDE SERPL-SCNC: 102 MMOL/L (ref 94–109)
CO2 SERPL-SCNC: 26 MMOL/L (ref 20–32)
CREAT SERPL-MCNC: 0.95 MG/DL (ref 0.66–1.25)
ERYTHROCYTE [DISTWIDTH] IN BLOOD BY AUTOMATED COUNT: 13 % (ref 10–15)
GFR SERPL CREATININE-BSD FRML MDRD: 80 ML/MIN/1.7M2
GLUCOSE BLDC GLUCOMTR-MCNC: 137 MG/DL (ref 70–99)
GLUCOSE BLDC GLUCOMTR-MCNC: 171 MG/DL (ref 70–99)
GLUCOSE BLDC GLUCOMTR-MCNC: 193 MG/DL (ref 70–99)
GLUCOSE BLDC GLUCOMTR-MCNC: 200 MG/DL (ref 70–99)
GLUCOSE BLDC GLUCOMTR-MCNC: 238 MG/DL (ref 70–99)
GLUCOSE SERPL-MCNC: 192 MG/DL (ref 70–99)
HCT VFR BLD AUTO: 36.7 % (ref 40–53)
HGB BLD-MCNC: 11.9 G/DL (ref 13.3–17.7)
MCH RBC QN AUTO: 27.6 PG (ref 26.5–33)
MCHC RBC AUTO-ENTMCNC: 32.4 G/DL (ref 31.5–36.5)
MCV RBC AUTO: 85 FL (ref 78–100)
PLATELET # BLD AUTO: 165 10E9/L (ref 150–450)
POTASSIUM SERPL-SCNC: 4.2 MMOL/L (ref 3.4–5.3)
RBC # BLD AUTO: 4.31 10E12/L (ref 4.4–5.9)
SODIUM SERPL-SCNC: 134 MMOL/L (ref 133–144)
WBC # BLD AUTO: 15.5 10E9/L (ref 4–11)

## 2018-06-28 PROCEDURE — 25000128 H RX IP 250 OP 636: Performed by: PHYSICIAN ASSISTANT

## 2018-06-28 PROCEDURE — 97165 OT EVAL LOW COMPLEX 30 MIN: CPT | Mod: GO

## 2018-06-28 PROCEDURE — 25000132 ZZH RX MED GY IP 250 OP 250 PS 637: Performed by: ORTHOPAEDIC SURGERY

## 2018-06-28 PROCEDURE — 00000146 ZZHCL STATISTIC GLUCOSE BY METER IP

## 2018-06-28 PROCEDURE — 12000007 ZZH R&B INTERMEDIATE

## 2018-06-28 PROCEDURE — 25000132 ZZH RX MED GY IP 250 OP 250 PS 637

## 2018-06-28 PROCEDURE — 25000132 ZZH RX MED GY IP 250 OP 250 PS 637: Performed by: EMERGENCY MEDICINE

## 2018-06-28 PROCEDURE — 40000133 ZZH STATISTIC OT WARD VISIT

## 2018-06-28 PROCEDURE — 25000131 ZZH RX MED GY IP 250 OP 636 PS 637

## 2018-06-28 PROCEDURE — 25000132 ZZH RX MED GY IP 250 OP 250 PS 637: Performed by: PHYSICIAN ASSISTANT

## 2018-06-28 PROCEDURE — 36415 COLL VENOUS BLD VENIPUNCTURE: CPT

## 2018-06-28 PROCEDURE — 97161 PT EVAL LOW COMPLEX 20 MIN: CPT | Mod: GP | Performed by: PHYSICAL THERAPIST

## 2018-06-28 PROCEDURE — 97116 GAIT TRAINING THERAPY: CPT | Mod: GP | Performed by: PHYSICAL THERAPIST

## 2018-06-28 PROCEDURE — 85027 COMPLETE CBC AUTOMATED: CPT

## 2018-06-28 PROCEDURE — 80048 BASIC METABOLIC PNL TOTAL CA: CPT

## 2018-06-28 PROCEDURE — 99232 SBSQ HOSP IP/OBS MODERATE 35: CPT

## 2018-06-28 PROCEDURE — 12000000 ZZH R&B MED SURG/OB

## 2018-06-28 PROCEDURE — 97535 SELF CARE MNGMENT TRAINING: CPT | Mod: GO

## 2018-06-28 PROCEDURE — 40000193 ZZH STATISTIC PT WARD VISIT: Performed by: PHYSICAL THERAPIST

## 2018-06-28 PROCEDURE — 97110 THERAPEUTIC EXERCISES: CPT | Mod: GP | Performed by: PHYSICAL THERAPIST

## 2018-06-28 PROCEDURE — 25000128 H RX IP 250 OP 636: Performed by: ORTHOPAEDIC SURGERY

## 2018-06-28 RX ORDER — OXYCODONE HYDROCHLORIDE 5 MG/1
5-10 TABLET ORAL
Qty: 30 TABLET | Refills: 0 | Status: SHIPPED | OUTPATIENT
Start: 2018-06-28 | End: 2019-01-10

## 2018-06-28 RX ORDER — AMOXICILLIN 250 MG
2 CAPSULE ORAL 2 TIMES DAILY
Qty: 100 TABLET | Refills: 0 | COMMUNITY
Start: 2018-06-28 | End: 2019-01-10

## 2018-06-28 RX ORDER — ACETAMINOPHEN 325 MG/1
650 TABLET ORAL EVERY 4 HOURS PRN
Qty: 100 TABLET | Refills: 0 | Status: SHIPPED | OUTPATIENT
Start: 2018-06-28 | End: 2019-01-10

## 2018-06-28 RX ADMIN — SENNOSIDES AND DOCUSATE SODIUM 2 TABLET: 8.6; 5 TABLET ORAL at 19:51

## 2018-06-28 RX ADMIN — ENOXAPARIN SODIUM 40 MG: 40 INJECTION SUBCUTANEOUS at 06:46

## 2018-06-28 RX ADMIN — BACLOFEN 10 MG: 10 TABLET ORAL at 17:30

## 2018-06-28 RX ADMIN — BACLOFEN 10 MG: 10 TABLET ORAL at 22:06

## 2018-06-28 RX ADMIN — SENNOSIDES AND DOCUSATE SODIUM 2 TABLET: 8.6; 5 TABLET ORAL at 09:53

## 2018-06-28 RX ADMIN — LISINOPRIL 40 MG: 40 TABLET ORAL at 09:44

## 2018-06-28 RX ADMIN — INSULIN GLARGINE 21 UNITS: 100 INJECTION, SOLUTION SUBCUTANEOUS at 06:46

## 2018-06-28 RX ADMIN — LINAGLIPTIN 5 MG: 5 TABLET, FILM COATED ORAL at 09:47

## 2018-06-28 RX ADMIN — ACETAMINOPHEN 975 MG: 325 TABLET, FILM COATED ORAL at 06:46

## 2018-06-28 RX ADMIN — ACETAMINOPHEN 975 MG: 325 TABLET, FILM COATED ORAL at 14:01

## 2018-06-28 RX ADMIN — CITALOPRAM HYDROBROMIDE 40 MG: 40 TABLET ORAL at 09:45

## 2018-06-28 RX ADMIN — INSULIN ASPART 2 UNITS: 100 INJECTION, SOLUTION INTRAVENOUS; SUBCUTANEOUS at 09:42

## 2018-06-28 RX ADMIN — OXYCODONE HYDROCHLORIDE 10 MG: 5 TABLET ORAL at 06:46

## 2018-06-28 RX ADMIN — INSULIN ASPART 2 UNITS: 100 INJECTION, SOLUTION INTRAVENOUS; SUBCUTANEOUS at 11:41

## 2018-06-28 RX ADMIN — ACETAMINOPHEN 975 MG: 325 TABLET, FILM COATED ORAL at 22:06

## 2018-06-28 RX ADMIN — METFORMIN HYDROCHLORIDE 2000 MG: 500 TABLET ORAL at 09:44

## 2018-06-28 RX ADMIN — OXYCODONE HYDROCHLORIDE 10 MG: 5 TABLET ORAL at 09:53

## 2018-06-28 RX ADMIN — OXYCODONE HYDROCHLORIDE 10 MG: 5 TABLET ORAL at 17:30

## 2018-06-28 RX ADMIN — BACLOFEN 10 MG: 10 TABLET ORAL at 09:49

## 2018-06-28 RX ADMIN — PIOGLITAZONE 15 MG: 15 TABLET ORAL at 09:46

## 2018-06-28 RX ADMIN — ATORVASTATIN CALCIUM 40 MG: 20 TABLET, FILM COATED ORAL at 09:46

## 2018-06-28 RX ADMIN — KETOROLAC TROMETHAMINE 30 MG: 30 INJECTION, SOLUTION INTRAMUSCULAR at 13:15

## 2018-06-28 RX ADMIN — CEFAZOLIN SODIUM 2 G: 2 INJECTION, SOLUTION INTRAVENOUS at 03:45

## 2018-06-28 RX ADMIN — GABAPENTIN 300 MG: 300 CAPSULE ORAL at 09:49

## 2018-06-28 RX ADMIN — OXYCODONE HYDROCHLORIDE 10 MG: 5 TABLET ORAL at 13:15

## 2018-06-28 RX ADMIN — HYDROXYZINE HYDROCHLORIDE 25 MG: 25 TABLET ORAL at 10:00

## 2018-06-28 RX ADMIN — GABAPENTIN 300 MG: 300 CAPSULE ORAL at 19:51

## 2018-06-28 NOTE — PROGRESS NOTES
06/28/18 1400   Quick Adds   Type of Visit Initial Occupational Therapy Evaluation   Living Environment   Lives With spouse   Living Arrangements ([facility])   Home Accessibility no concerns   Living Environment Comment walk-in shower   Functional Level Prior   Ambulation 0-->independent   Transferring 0-->independent   Toileting 0-->independent  (vanity on L side)   Bathing 0-->independent   Dressing 0-->independent   Eating 0-->independent   Communication 0-->understands/communicates without difficulty   Swallowing 0-->swallows foods/liquids without difficulty   Cognition 0 - no cognition issues reported   Fall history within last six months yes   Number of times patient has fallen within last six months 2   Which of the above functional risks had a recent onset or change? fall history  (fall on ice and fall off ladder)   Prior Functional Level Comment independent; Active   General Information   Onset of Illness/Injury or Date of Surgery - Date 06/26/18   Referring Physician Donnie Tim MD   Patient/Family Goals Statement To return home.    Additional Occupational Profile Info/Pertinent History of Current Problem 63-year-old male I was asked to see here by the Emergency Room for urgent evaluation of a left femoral neck fracture he sustained on 06/26/2018.  Apparently, he was at work standing on a ladder, the ladder started coming out from underneath him and he jumped.  Apparently, he landed wrong on the hip immediately sustaining significant pain.  He was able to hobble to the car and drive to the Emergency Room where he was evaluated.- sustained Closed impacted left femoral neck fracture. ; s/p ORIF   Weight-Bearing Status - LLE nonweight-bearing   Cognitive Status Examination   Orientation orientation to person, place and time   Pain Assessment   Patient Currently in Pain Yes, see Vital Sign flowsheet  (Minimal)   Transfer Skill: Bed to Chair/Chair to Bed   Level of [place]: Bed to Chair  independent   Physical Assist/Nonphysical Assist: Bed to Chair supervision   Weight-Bearing Restrictions nonweight-bearing   Assistive Device - Transfer Skill Bed to Chair Chair to Bed Rehab Eval rolling walker   Transfer Skill: Sit to Stand   Level of [place]: Sit/Stand independent   Physical Assist/Nonphysical Assist: Sit/Stand supervision   Transfer Skill: Sit to Stand nonweight-bearing   Assistive Device for Transfer: Sit/Stand rolling walker   Transfer Skill: Toilet Transfer   Level of [place]: Toilet independent   Physical Assist/Nonphysical Assist: Toilet supervision   Weight-Bearing Restrictions: Toilet nonweight-bearing   Assistive Device rolling walker   Toilet Transfer Skill Comments L grab bar to simulate vanity   Upper Body Dressing   Level of [place]: Dress Upper Body independent   Lower Body Dressing   Level of [place]: Dress Lower Body independent  (for pants- states can have assist with socks. )   Toileting   Level of [place]: Toilet independent   Grooming   Level of [place]: Grooming independent   Eating/Self Feeding   Level of [place]: Eating independent   Activities of Daily Living Analysis   Impairments Contributing to Impaired Activities of Daily Living post surgical precautions   General Therapy Interventions   Planned Therapy Interventions ADL retraining   Clinical Impression   Criteria for Skilled Therapeutic Interventions Met yes, treatment indicated   OT Diagnosis decreased independence with ADLs   Influenced by the following impairments NWB L LE   Assessment of Occupational Performance 1-3 Performance Deficits   Identified Performance Deficits functional mobility   Clinical Decision Making (Complexity) Low complexity   Therapy Frequency daily   Predicted Duration of Therapy Intervention (days/wks) 1x treat   Anticipated Equipment Needs at Discharge shower chair  (getting at outside facility)   Anticipated Discharge Disposition Home   Risks and Benefits  "of Treatment have been explained. Yes   Patient, Family & other staff in agreement with plan of care Yes   [facility] AM-PAC  \"6 Clicks\" Daily Activity Inpatient Short Form   1. Putting on and taking off regular lower body clothing? 3 - A Little   2. Bathing (including washing, rinsing, drying)? 4 - None   3. Toileting, which includes using toilet, bedpan or urinal? 4 - None   4. Putting on and taking off regular upper body clothing? 4 - None   5. Taking care of personal grooming such as brushing teeth? 4 - None   6. Eating meals? 4 - None   Daily Activity Raw Score (Score out of 24.Lower scores equate to lower levels of function) 23   Total Evaluation Time   Total Evaluation Time (Minutes) 8     "

## 2018-06-28 NOTE — PROGRESS NOTES
"[facility] Orthopaedics Progress Note      Post-operative Day: 1 Day Post-Op    Procedure(s):  Perc Pinning Left Hip - Wound Class: I-Clean      Subjective:    Pain: moderate  aching to L hip. Denies shooting pain, parasthesias, numbness and weakness.   denies Chest pain, SOB, O2 required: None  denies nausea/ emesis  denies lightheadedness, dizziness and weakness  BM -, passing gas +. Urinating well, Bose in place: no.       Objective:  Blood pressure 140/77, pulse 57, temperature 98.1  F (36.7  C), temperature source Oral, resp. rate 16, height 1.854 m (6' 1\"), weight 94.3 kg (208 lb), SpO2 98 %.    Patient Vitals for the past 24 hrs:   BP Temp Temp src Pulse Heart Rate Resp SpO2   06/28/18 0722 140/77 98.1  F (36.7  C) Oral - 57 16 98 %   06/28/18 0626 - - - - - - 96 %   06/28/18 0334 112/60 97.1  F (36.2  C) Oral - 62 18 99 %   06/28/18 0017 119/65 98.5  F (36.9  C) Oral - 52 18 99 %   06/27/18 2100 - - - - - - 98 %   06/27/18 1913 107/49 98.3  F (36.8  C) Oral 57 - 18 98 %   06/27/18 1835 - - - - - - 96 %   06/27/18 1825 - - - - - - (!) 85 %   06/27/18 1708 - - - - 57 16 97 %   06/27/18 1630 97/49 - - 75 - 16 97 %   06/27/18 1600 101/41 - - 71 - 15 95 %   06/27/18 1515 121/70 97.3  F (36.3  C) Oral 57 90 12 99 %   06/27/18 1437 102/61 - - - - - -   06/27/18 1352 - - - - - - 94 %   06/27/18 1346 - - - - - - 92 %   06/27/18 1345 - - - - - - 91 %   06/27/18 1344 - - - - 65 13 91 %   06/27/18 1339 98/60 - - - - - -   06/27/18 1323 - - - - 90 13 95 %   06/27/18 1309 100/66 98.4  F (36.9  C) Oral 73 - - 95 %       Wt Readings from Last 4 Encounters:   06/26/18 94.3 kg (208 lb)     General: Alert and orientated. No apparent distress. Non-labored breathing. Appropriate affect.   MSK: L hip: dressing Clean, dry, and intact. Skin intact, no ecchymosis, no erythema. nontender to palpation to incision site. ROM appropriate for post op. Distal neurovascularly intact. Compartments soft and non-tender. No calf pain. Homans " negative. PF/DF and EHL intact.       Pertinent Labs   Lab Results: personally reviewed.     Recent Labs   Lab Test  06/28/18   0717  06/27/18   0644  06/26/18   1905   HGB  11.9*  12.4*  13.5   HCT  36.7*  38.9*  41.7   MCV  85  86  85   PLT  165  162  222   NA  134   --   135         Procedure(s):  Perc Pinning Left Hip - Wound Class: I-Clean  Plan: Anticoagulation protocol: Lovenox inpatient and then  mg daily at discharge  x 42  days            Pain medications:  oxycodone and tylenol            Weight bearing status:  NWB            Dressing Change: dry dressing change with gauze             Disposition:  Home tomorrow             Follow up: 2 week with NAVYA            Continue cares and rehabilitation     Report completed by:  Leslye Cruz PA-C  Date: 6/28/2018  Time: 1:01 PM

## 2018-06-28 NOTE — PLAN OF CARE
Problem: Patient Care Overview  Goal: Plan of Care/Patient Progress Review  Discharge Planner PT   Patient plan for discharge: Home w/ assistance from his family as needed  Current status: Eval. Completed. Pt reporting minimal pain    Ptt able to transfer, ambulate w/ RW,SBA to indep. maintaining NWB. Pt resides in a town home w/ no steps to amb.   Barriers to return to prior living situation: medical stability  Recommendations for discharge: home w/ assistance from his family  Rationale for recommendations: One time session  . Pt demonstrates safe gait technique w/ use of walker on level surfaces , with transfers while maintaining  NWB.  Practiced w/ RW and crutches. Pt prefers the walker. Wife can obtain walker from [employer] store independently w/ prescription        Entered by: María Elena Lambert 06/28/2018 12:56 PM         Physical Therapy Discharge Summary    Reason for therapy discharge:    All goals and outcomes met, this episode of care    Progress towards therapy goal(s). See goals on Care Plan in [facility] electronic health record for goal details.  Goals met    Therapy recommendation(s):    Continue home exercise program.

## 2018-06-28 NOTE — PROGRESS NOTES
06/28/18 0900   Quick Adds   Type of Visit Initial PT Evaluation   Living Environment   Lives With spouse   Living Arrangements other (see comments)  ([facility])   Home Accessibility no concerns   Functional Level Prior   Ambulation 0-->independent   Transferring 0-->independent   Toileting 0-->independent   Bathing 0-->independent   Dressing 0-->independent   Eating 0-->independent   Communication 0-->understands/communicates without difficulty   Swallowing 0-->swallows foods/liquids without difficulty   Cognition 0 - no cognition issues reported   Fall history within last six months yes   Number of times patient has fallen within last six months 2   Which of the above functional risks had a recent onset or change? fall history  [place] and [place].)   Prior Functional Level Comment Independent   General Information   Onset of Illness/Injury or Date of Surgery - Date 06/27/18   Referring Physician [place]   Patient/Family Goals Statement Pt w/ goal of returning home   Pertinent History of Current Problem (include personal factors and/or comorbidities that impact the POC) 63-year-old male I was asked to see here by the Emergency Room for urgent evaluation of a left femoral neck fracture he sustained on 06/26/2018.  Apparently, he was at work standing on a ladder, the ladder started coming out from underneath him and he jumped.  Apparently, he landed wrong on the hip immediately sustaining significant pain.  He was able to hobble to the car and drive to the Emergency Room where he was evaluated.- sustained Closed impacted left femoral neck fracture. ; s/p ORIF   Weight-Bearing Status - LLE nonweight-bearing   General Observations Pt reports no pain at rest , minimal w/ activity    Cognitive Status Examination   Orientation orientation to person, place and time   Level of Consciousness alert   Integumentary/Edema   Integumentary/Edema no deficits were identifed   Posture   "  Posture Not impaired   Range of Motion (ROM)   ROM Comment WF    Bed Mobility   Bed Mobility Comments SBA supine<. > sitting. Wife present able to assist  as needed   Transfer Skills   Transfer Comments Practiced sit> stand w/ RW. initial cues only, pt able to perform SBA to indep. With RW maintaining NWB   Gait   Gait Comments Pt amb. 50 feet x1 with RW, SBA, maintaining NWB.  Also trialed  crutches. Pt feel more comfortable w/  walker   Balance   Balance Comments good dynamic standing balance   Sensory Examination   Sensory Perception no deficits were identified   General Therapy Interventions   Planned Therapy Interventions gait training;strengthening;home program guidelines   Clinical Impression   Criteria for Skilled Therapeutic Intervention yes, treatment indicated   PT Diagnosis L hip ORIF   Influenced by the following impairments NWB status   Functional limitations due to impairments decreased amb. status   Clinical Presentation Stable/Uncomplicated   Clinical Presentation Rationale clinical judgement   Clinical Decision Making (Complexity) Low complexity   Therapy Frequency` daily   Anticipated Equipment Needs at Discharge front wheeled walker   Anticipated Discharge Disposition Home with Assist   Risk & Benefits of therapy have been explained Yes   Patient, Family & other staff in agreement with plan of care Yes   [facility] AM-PAC  \"6 Clicks\" V.2 Basic Mobility Inpatient Short Form   1. Turning from your back to your side while in a flat bed without using bedrails? 4 - None   2. Moving from lying on your back to sitting on the side of a flat bed without using bedrails? 3 - A Little   3. Moving to and from a bed to a chair (including a wheelchair)? 3 - A Little   4. Standing up from a chair using your arms (e.g., wheelchair, or bedside chair)? 3 - A Little   5. To walk in hospital room? 4 - None   6. Climbing 3-5 steps with a railing? 3 - A Little  (NT- does not need to perform)   Basic Mobility " Raw Score (Score out of 24.Lower scores equate to lower levels of function) 20   Total Evaluation Time   Total Evaluation Time (Minutes) 10

## 2018-06-28 NOTE — DISCHARGE INSTRUCTIONS
Orthopedic Surgery Discharge Instructions    1. Follow up:  Follow up with Declan Eaton PA-C.  in 2 weeks for post op check and x rays as scheduled.  Call [phone] if appointment needed or questions. If you have questions or concerns while at home, please call [facility]. If it is an emergency, call [phone]. You may find the answers to questions in the included discharge packet from the hospital or your pre operative packet you received in clinic prior to surgery.    2. Pain Medication:  Use pain medication as directed. If you are prescribed narcotic pain medications (Oxycodone, Norco, Percocet, Tylenol #3, Dilaudid, or Tramadol) then you should try to wean off of them as tolerated. These are an AS NEEDED medication, so if you are not having significant pain you should try to take fewer pills at a time or spread out the doses out over a longer period of time than is written on the prescription. You should not drive a car or operate machinery if you are taking prescribed narcotic pain medication. You may not receive a refill on this medication by your surgical team until your follow up appointment, so try to wean off your narcotics as soon as possible. If you need a refill on this medication you may call your surgical team to discuss during business hours. Refills are not given on the weekend under any circumstances, so plan accordingly.    3. How to wean narcotics: Within 2-3 days of surgery you should be able to begin weaning your pain medications. If upon leaving the hospital you are taking 2 tabs every 3-4 hours, you should decrease this to 1 tab every 3-4 hours. Around 4-5 days after surgery you should begin decreasing the frequency of your pain medication to every 4-5 hours. You may also cut your pills in half to decrease the dose as you begin the weaning process. Create a weaning schedule of your pain medication when you get home from the hospital, you may be expected to make the prescription  last until your next appointment. Call your surgical team during business hours to discuss your pain medication if you have questions or concerns about the weaning process.    4. Tylenol and pain medications: If your pain pill contains Tylenol (i.e. Percocet, Norco, Tylenol #3) and you are also taking additional Tylenol/acetaminophen as a pain medication, ensure that you are not taking too much tylenol. Prescription narcotic medications that contain Tylenol usually have 325mg of Tylenol per pill and over-the-counter medications have variable doses of Tylenol. You should not exceed 4,000mg of Tylenol in a 24-hour period. Tylenol should be used in combination with your pain medication, if able, to help reduce the amount of pain medication you are taking.    5. NSAIDs (anti inflammatory medications): You may take NSAIDs to help control your pain at home.  NSAIDs are Ibuprofen, Motrin, Advil, Aleve, Naprosyn etc. You should use over the counter medications such as NSAIDs and Tylenol to wean off narcotics. If you are also taking Aspirin for blood clot prevention, you should use caution with NSAIDs as this may cause issues such as GI bleeding, upset stomach, and dark stools. Recommended doses of NSAIDs after surgery are 1-2 tabs every 6-8 hours, not to exceed 600 mg per dose. It is best to rotate Tylenol and NSAIDs if needed every 4 hours. Use these medications in between your narcotics to help reduce the amount of pain medication needed.      6. How to take over the counter medications with pain medications:  Sample medication schedule:   8 am: Pain medication  10 am: Tylenol 500-650 mg (skip if your pain medication contains tylenol, refer to #4)  12 pm: Pain medication  2 pm: NSAIDs 1-2 tabs, not to exceeded 600 mg  4 pm: Pain medication  6 pm: Tylenol 500-650 mg (skip if your pain medication contains tylenol, refer to #4)  8 pm: Pain medication    7. Applying ice to your incision: ice can provide additional pain relief  at home. Apply  ice in 20 minute intervals. Allow your skin to warm up to room temperature, approximately 40 minutes, before applying another ice pack.    8. Incision instructions:  Keep your incision clean, covered and dry until your post op appointment.  You may shower and get the incision wet if no drainage is present.  You may change your dressing as needed.      9. Blood Clot Prevention: Take Aspirin 325 mg  daily  for 42 days for anticoagulation. If you develop abdominal pain or have signs of bleeding - blood in stool or black stools, stop taking the medication and seek medical care. Walk often at home, walking will help reduce the risk of blood clots. If you have been prescribed roland stockings or compression stockings, wear them during the day until you follow up with your orthopedic team in clinic. If you were not given Roland Stockings in the hospital but would like them, they can be purchased over the counter at your local pharmacy.     10. Call the office if you have any questions/concerns or are experiencing the following:  - increasing drainage from the incision  - foul-smelling or malodorous drainage from the incision  - sudden increase or change in pain that is not controlled by your prescribed medications  - inability to urinate  - new onset of weakness, numbness or severe pain in the extremities  - bowel/bladder incontinence  - Fever greater than 101.5 degrees  - Nausea/vomitting causing inability to eat food or medications

## 2018-06-28 NOTE — PLAN OF CARE
Problem: Patient Care Overview  Goal: Plan of Care/Patient Progress Review  Outcome: Improving  Patient A/O x4. Pain well controlled with pain regimen per MAR. Moving well in room with one person assist. Non weight bearing maintained. PO well tolerated; IV fluids stopped. O2 turned down to 1 LPM; sat at upper 90%.     Temp: 97.1  F (36.2  C) Temp src: Oral BP: 112/60 Pulse: 57 Heart Rate: 62 Resp: 18 SpO2: 99 % O2 Device: Nasal cannula Oxygen Delivery: 1 LPM    Continue to monitor and implement poc.

## 2018-06-28 NOTE — PLAN OF CARE
Problem: Patient Care Overview  Goal: Plan of Care/Patient Progress Review  Discharge Planner OT   Patient plan for discharge: Home    Current status: independent with ADLs. Time spent educating pt on AE. Pt states wife will be getting a shower chair. Recommend shower chair with arms.     Barriers to return to prior living situation: None    Recommendations for discharge: Home    Rationale for recommendations: Met all OT goals.     Occupational Therapy Discharge Summary    Reason for therapy discharge:    All goals and outcomes met, no further needs identified.    Progress towards therapy goal(s). See goals on Care Plan in [facility] electronic health record for goal details.  Goals met    Therapy recommendation(s):    No further therapy is recommended.           Entered by: Amy Ruano 06/28/2018 2:27 PM

## 2018-06-28 NOTE — PROGRESS NOTES
[facility]    Hospital Medicine Progress Note  Date of Service: 06/28/2018    Assessment & Plan   Calos Callahan is a 63 year old male with a past medical history significant for known aortic valve stenosis, hypertension, dyslipidemia, type 2 diabetes mellitus, anxiety & depression, known pulmonary nodules, erectile dysfunction, and tobacco dependence who presents on 6/26/2018 with left hip pain after fall.    Fracture of femoral neck, left (H)  Occurred from falling off ladder. X-ray pelvis and hip showied left femoral neck fracture, probably extending to intertrochanteric region.  Is S/P percutaneous screw fixation x3, Dr. Tim, today is POD #1.  Patient recovering well both by his report and per the Ortho note.  - Analgesia, DVT prophylaxis, activity per Orthopedics.  - Likely home tomorrow per Orthopedics note.        Aortic valve stenosis  Bicuspid aortic valve  Previously diagnosed. Follows with Dr. Douglas at [facility]. No history of valve surgery, it was not recommended after patient's last visit in January 2018. Yearly echocardiograms, most recently in Jan 2018:  1. Normal left ventricular chamber size. Normal left ventricular systolic function. No regional wall motion abnormalities.  Moderate concentric increase in left ventricular wall thickness.  Estimated left ventricular ejection fraction is 65-70%.   2. Normal right ventricular chamber size. Normal right ventricular systolic function.   3. Bicuspid aortic valve. Diffuse thickening and calcification of the aortic valve cusps with   reduced excursion. Moderate-to-severe aortic stenosis. Mean transvalvular gradient is 35.1 mmHg. This is probably being underestimated secondary to technical issues. The aortic valve area is 1.2 cm2 and the dimensionless index is 0.3.   4. Mildly dilated ascending aorta (4.0cm).   5. When compared to the previous echocardiographic images of 02/08/2017, there has been no   significant change.  The mean gradeint is lower though this is probably related to technical reasons rather then a true clinical chnage.    No prior to admission problems with angina, syncope/presyncope, or CHF.  No evidence of CHF currently, no O2 needs.  - Observe.    - Continue usual lisinopril/HCTZ.    Leukocytosis  Presented with WBC 17.2, left shift. Afebrile. No evidence for infection. Likely stress response. WBC normal as of 6/27/18 AM.  - Problem resolved.     Hypokalemia  Presented with potassium 3.3, normal today at 4.2 after replacement.  - Continue K protocol.     Long-term insulin use in type 2 diabetes (H)  Presented with glucose 62. Asymptomatic. Most recent HgbA1c 6.7. Taking metformin, Actos, Glyxambi, and Lantus (42 U q hs) to manage.  Had a glucose spike after a DQ Blizzard yesterday afternoon, control has been improved since then.  - Continue Lantus 42 U q hs, as well as usual metformin, Actos, and Glyxambi.  - Medium dose sliding scale insulin in place.  - Hyper/hypoglycemia protocol in place.        Benign essential hypertension  Good BP control here.  - Continue lisinopril-hydrochlorothiazide as prior to admission.        Mixed hyperlipidemia  Taking Lipitor prior to admission, continue.        Adjustment disorder with mixed anxiety and depressed mood  Taking Celexa prior to admission, continue.        Tobacco dependence syndrome  Daily cigar use, trying to quit. Was prescribed Chantix last week but has not yet started. Declines nicotine replacement at this time.     ED (erectile dysfunction)  Taking prn Viagra prior to admission, hold.     Pulmonary nodules  Noted in 2014, had repeat CT in 2016. Appears to be overdue for repeat imaging.  - Defer ongoing monitoring to outpatient.        Fluids: IVF discontinued.  Electrolytes: K protocol  Nutrition: Consistent carbohydrate.     DVT Prophylaxis: Per Orthopedics.  Code Status: Full Code.     Lines: Peripheral  Bose catheter: Not indicated     Disposition:  "Anticipate discharge in AM per Orthopedics note today. Appropriate for Inpatient care.     Discussion: Appears comfortable and stable on POD#1.    Attestation:  I have reviewed today's vital signs, notes, medications, labs and imaging.  Amount of time performed on this follow-up visit: 25 minutes, 15 of which were spent in care coordination and counseling with the patient and his wife.    Melvin Rajput MD      Interval History   Doing \"great\".  Pain control good.  Denies dyspnea, chest pain, dizziness.    Physical Exam   Temp:  [97.1  F (36.2  C)-98.5  F (36.9  C)] 98.5  F (36.9  C)  Pulse:  [57] 57  Heart Rate:  [52-62] 56  Resp:  [16-18] 16  BP: (107-140)/(49-77) 121/65  SpO2:  [85 %-99 %] 97 %    Weights:   Vitals:    06/26/18 1731   Weight: 94.3 kg (208 lb)    Body mass index is 27.44 kg/(m^2).    GENERAL: Pleasant man, sitting up in bed, looks comfortable.  EYES: Eyes grossly normal to inspection, extraocular movements intact  HENT: Nares patent bilaterally.  Nasal mucosa normal, no discharge.    NECK: Trachea midline, no stridor.   RESP: No accessory muscle use.  Symmetrical breath sounds.  Lungs clear anteriorly on inspiration and expiration.  Expiration not prolonged, no wheeze.  CV: Regular rate and rhythm, non-tachycardic.  Normal S1 S2, grade III/VI midsystolic murmur heard best over upper sternal borders, no extra sound.  No lower extremity edema.  ABDOMEN: Soft, non-tender, no guarding.  Liver and spleen not enlarged, no masses palpable.  Bowel sounds positive.  MS: No bony deformities noted.  No red or inflamed joints.  Operative site not examined.  SKIN: Warm and dry, no rashes where skin visible.  NEURO: Alert, oriented, conversant.  Cranial nerves II - XII grossly intact.  No gross motor or sensory deficits.    PSYCH: Calm, alert, conversant.  Able to articulate logical thoughts, no tangential thoughts, no hallucinations or delusions.  Affect normal.        Data     Recent Labs  Lab " 06/28/18  0717 06/27/18  0916 06/27/18  0644 06/26/18  1905   WBC 15.5*  --  10.9 17.2*   HGB 11.9*  --  12.4* 13.5   MCV 85  --  86 85     --  162 222     --   --  135   POTASSIUM 4.2 3.7  --  3.3*   CHLORIDE 102  --   --  103   CO2 26  --   --  28   BUN 31*  --   --  28   CR 0.95  --   --  0.97   ANIONGAP 6  --   --  4   JAMARCUS 8.6  --   --  8.9   *  --   --  62*         Recent Labs  Lab 06/28/18  1649 06/28/18  1124 06/28/18  0717 06/28/18  0634 06/28/18  0255 06/27/18  2123 06/27/18  1639  06/26/18  1905   GLC  --   --  192*  --   --   --   --   --  62*   * 238*  --  193* 200* 260* 324*  < >  --    < > = values in this interval not displayed.     Unresulted Labs Ordered in the Past 30 Days of this Admission     No orders found from 4/27/2018 to 6/27/2018.           Imaging  No results found for this or any previous visit (from the past 24 hour(s)).     I reviewed all new labs and imaging results over the last 24 hours. I personally reviewed no images or EKG's today.    Medications       acetaminophen  975 mg Oral Q8H     atorvastatin (LIPITOR) tablet 40 mg  40 mg Oral Daily     baclofen  10 mg Oral TID     citalopram  40 mg Oral Daily     enoxaparin  40 mg Subcutaneous Q24H     gabapentin  300 mg Oral BID     lisinopril  40 mg Oral Daily    Or     hydrochlorothiazide  25 mg Oral Daily     insulin aspart  1-7 Units Subcutaneous TID AC     insulin aspart  1-5 Units Subcutaneous At Bedtime     insulin glargine  21 Units Subcutaneous QAM AC     linagliptin  5 mg Oral QAM     metFORMIN (GLUCOPHAGE) tablet 2,000 mg  2,000 mg Oral Daily with breakfast     pioglitazone (ACTOS) tablet 15 mg  15 mg Oral Daily     senna-docusate  1 tablet Oral BID    Or     senna-docusate  2 tablet Oral BID     sodium chloride (PF)  3 mL Intracatheter Q8H       Melvin Rajput MD

## 2018-06-29 VITALS
TEMPERATURE: 97.9 F | HEIGHT: 73 IN | OXYGEN SATURATION: 96 % | SYSTOLIC BLOOD PRESSURE: 140 MMHG | RESPIRATION RATE: 18 BRPM | DIASTOLIC BLOOD PRESSURE: 83 MMHG | BODY MASS INDEX: 27.57 KG/M2 | HEART RATE: 57 BPM | WEIGHT: 208 LBS

## 2018-06-29 LAB
GLUCOSE BLDC GLUCOMTR-MCNC: 113 MG/DL (ref 70–99)
GLUCOSE BLDC GLUCOMTR-MCNC: 134 MG/DL (ref 70–99)
GLUCOSE BLDC GLUCOMTR-MCNC: 151 MG/DL (ref 70–99)
HGB BLD-MCNC: 11.4 G/DL (ref 13.3–17.7)

## 2018-06-29 PROCEDURE — 00000146 ZZHCL STATISTIC GLUCOSE BY METER IP

## 2018-06-29 PROCEDURE — 25000131 ZZH RX MED GY IP 250 OP 636 PS 637

## 2018-06-29 PROCEDURE — 25000128 H RX IP 250 OP 636: Performed by: ORTHOPAEDIC SURGERY

## 2018-06-29 PROCEDURE — 85018 HEMOGLOBIN: CPT | Performed by: ORTHOPAEDIC SURGERY

## 2018-06-29 PROCEDURE — 99238 HOSP IP/OBS DSCHRG MGMT 30/<: CPT

## 2018-06-29 PROCEDURE — 25000132 ZZH RX MED GY IP 250 OP 250 PS 637: Performed by: PHYSICIAN ASSISTANT

## 2018-06-29 PROCEDURE — 25000132 ZZH RX MED GY IP 250 OP 250 PS 637

## 2018-06-29 PROCEDURE — 25000132 ZZH RX MED GY IP 250 OP 250 PS 637: Performed by: ORTHOPAEDIC SURGERY

## 2018-06-29 PROCEDURE — 36415 COLL VENOUS BLD VENIPUNCTURE: CPT | Performed by: ORTHOPAEDIC SURGERY

## 2018-06-29 PROCEDURE — 25000132 ZZH RX MED GY IP 250 OP 250 PS 637: Performed by: EMERGENCY MEDICINE

## 2018-06-29 RX ORDER — BACLOFEN 10 MG/1
10 TABLET ORAL 3 TIMES DAILY
Qty: 20 TABLET | Refills: 1 | Status: SHIPPED | OUTPATIENT
Start: 2018-06-29 | End: 2019-01-10

## 2018-06-29 RX ADMIN — METFORMIN HYDROCHLORIDE 2000 MG: 500 TABLET ORAL at 08:46

## 2018-06-29 RX ADMIN — ACETAMINOPHEN 975 MG: 325 TABLET, FILM COATED ORAL at 06:08

## 2018-06-29 RX ADMIN — CITALOPRAM HYDROBROMIDE 40 MG: 40 TABLET ORAL at 08:46

## 2018-06-29 RX ADMIN — INSULIN ASPART 1 UNITS: 100 INJECTION, SOLUTION INTRAVENOUS; SUBCUTANEOUS at 12:11

## 2018-06-29 RX ADMIN — ATORVASTATIN CALCIUM 40 MG: 20 TABLET, FILM COATED ORAL at 08:46

## 2018-06-29 RX ADMIN — LISINOPRIL 40 MG: 40 TABLET ORAL at 08:45

## 2018-06-29 RX ADMIN — SENNOSIDES AND DOCUSATE SODIUM 2 TABLET: 8.6; 5 TABLET ORAL at 08:46

## 2018-06-29 RX ADMIN — BACLOFEN 10 MG: 10 TABLET ORAL at 08:46

## 2018-06-29 RX ADMIN — INSULIN GLARGINE 21 UNITS: 100 INJECTION, SOLUTION SUBCUTANEOUS at 08:49

## 2018-06-29 RX ADMIN — ENOXAPARIN SODIUM 40 MG: 40 INJECTION SUBCUTANEOUS at 06:08

## 2018-06-29 RX ADMIN — LINAGLIPTIN 5 MG: 5 TABLET, FILM COATED ORAL at 08:49

## 2018-06-29 RX ADMIN — PIOGLITAZONE 15 MG: 15 TABLET ORAL at 08:48

## 2018-06-29 RX ADMIN — OXYCODONE HYDROCHLORIDE 10 MG: 5 TABLET ORAL at 12:09

## 2018-06-29 RX ADMIN — OXYCODONE HYDROCHLORIDE 10 MG: 5 TABLET ORAL at 06:09

## 2018-06-29 RX ADMIN — GABAPENTIN 300 MG: 300 CAPSULE ORAL at 08:46

## 2018-06-29 NOTE — PROGRESS NOTES
"[facility] Orthopaedics Progress Note      Post-operative Day: 2 Days Post-Op    Procedure(s):  Perc Pinning Left Hip - Wound Class: I-Clean      Subjective:    Pain: moderate  aching to L hip. Denies shooting pain, parasthesias, numbness and weakness.   denies Chest pain, SOB, O2 required: None  denies nausea/ emesis  denies lightheadedness, dizziness and weakness  BM -, passing gas +. Urinating well, Bose in place: no.       Objective:  Blood pressure 140/83, pulse 57, temperature 97.9  F (36.6  C), temperature source Oral, resp. rate 18, height 1.854 m (6' 1\"), weight 94.3 kg (208 lb), SpO2 96 %.    Patient Vitals for the past 24 hrs:   BP Temp Temp src Heart Rate Resp SpO2   06/29/18 0807 140/83 97.9  F (36.6  C) Oral 63 18 96 %   06/29/18 0029 107/58 97.3  F (36.3  C) Oral 56 16 97 %   06/28/18 2138 146/79 97.7  F (36.5  C) Oral - 18 96 %   06/28/18 1500 121/65 98.5  F (36.9  C) Oral 56 16 97 %       Wt Readings from Last 4 Encounters:   06/26/18 94.3 kg (208 lb)     General: Alert and orientated. No apparent distress. Non-labored breathing. Appropriate affect.   MSK: L hip: dressing Clean, dry, and intact. Skin intact, no ecchymosis, no erythema. nontender to palpation to incision site. ROM appropriate for post op. Distal neurovascularly intact. Compartments soft and non-tender. No calf pain. Homans negative. PF/DF and EHL intact.       Pertinent Labs   Lab Results: personally reviewed.     Recent Labs   Lab Test  06/29/18   0712  06/28/18   0717  06/27/18   0644  06/26/18   1905   HGB  11.4*  11.9*  12.4*  13.5   HCT   --   36.7*  38.9*  41.7   MCV   --   85  86  85   PLT   --   165  162  222   NA   --   134   --   135         Procedure(s):  Perc Pinning Left Hip - Wound Class: I-Clean  Plan: Anticoagulation protocol: Lovenox inpatient and then  mg daily at discharge  x 42  days            Pain medications:  oxycodone, tylenol and baclofen            Weight bearing status:  NWB LLE            " Dressing Change:  sterile dry dressing change with gauze            Disposition:  Home today             Follow up: 2 week with [facility]            Continue cares and rehabilitation     Report completed by:  Leslye Cruz PA-C  Date: 6/29/2018  Time: 10:57 AM

## 2018-06-29 NOTE — PLAN OF CARE
"Problem: Fracture Orthopaedic (Adult)  Goal: Signs and Symptoms of Listed Potential Problems Will be Absent, Minimized or Managed (Fracture Orthopaedic)  Signs and symptoms of listed potential problems will be absent, minimized or managed by discharge/transition of care (reference Fracture Orthopaedic (Adult) CPG).   Outcome: Improving  Patient reports \"pain is comfortable\" at this time.  Medicated with scheduled tylenol, baclofen and gabapentin; also medicated with AS NEEDED oxycodone.  Patient is Non Weight bearing on LEFT LOWER EXTREMITY and ambulated in hallway with NA with gait belt and walker.  Slight unsteady with walker that wife brought in from home (did not have wheels on it); so used hospital walker with wheels.    Patient did report \"some pain/cramp in left thigh that went into calf.\"  FYI message sent to Dr. Rajput and to Leslye GRIJALVA.      "

## 2018-06-29 NOTE — PLAN OF CARE
Problem: Fracture Orthopaedic (Adult)  Goal: Signs and Symptoms of Listed Potential Problems Will be Absent, Minimized or Managed (Fracture Orthopaedic)  Signs and symptoms of listed potential problems will be absent, minimized or managed by discharge/transition of care (reference Fracture Orthopaedic (Adult) CPG).   Outcome: Improving  Patient is tolerating activity with walker and assist of 1; patient is Non weight bearing on LEFT LOWER EXTREMITY.  Dressing changed after shower today; has 2 steri strips that are intact.  Applied dry gauze and paper tape over incision.  Taking scheduled tylenol, baclofen and gabapentin; also taking AS NEEDED oxycodone and vistaril for pain.

## 2018-06-29 NOTE — PLAN OF CARE
Problem: Patient Care Overview  Goal: Plan of Care/Patient Progress Review  Outcome: Improving  Patient reports sleeping well overnight. Denied need for pain med until this am. He was given 10 mg oxycodone this am.

## 2018-06-29 NOTE — PLAN OF CARE
Problem: Patient Care Overview  Goal: Plan of Care/Patient Progress Review  Outcome: Completed Date Met: 06/29/18  KODY BEDOLLAG DISCHARGE NOTE    Patient discharged to home at 2:55 PM via wheel chair. Accompanied by spouse and daughter and staff. Discharge instructions reviewed with patient, spouse and daughter, opportunity offered to ask questions. Prescriptions sent to patients preferred pharmacy. All belongings sent with patient.    Ryann Disla

## 2018-06-29 NOTE — DISCHARGE SUMMARY
[facility]    Discharge Summary  Hospital Medicine    Date of Admission:  6/26/2018  Date of Discharge:  6/29/2018   Discharging Provider: Melvin Rajput  Date of Service: 6/29/2018      Primary Care     Semmler, Steven Duane  [facility] [address] [id number]      Identification and Chief Compaint: Calos Callahan is a 63 year old male with a past medical history significant for known aortic valve stenosis, hypertension, dyslipidemia, type 2 diabetes mellitus, anxiety & depression, known pulmonary nodules, erectile dysfunction, and tobacco dependence who presents on 6/26/2018 with left hip pain after fall.    Discharge Diagnoses       Fracture of femoral neck, left (H)    Mixed hyperlipidemia    Benign essential hypertension    Aortic valve stenosis    Long-term insulin use in type 2 diabetes (H)    ED (erectile dysfunction)    Bicuspid aortic valve    Pulmonary nodules    Adjustment disorder with mixed anxiety and depressed mood    Leukocytosis    Hypokalemia    Fall from ladder, initial encounter    Tobacco dependence syndrome      Discharge Disposition   Discharged to home    Discharge Orders     Reason for your hospital stay   Left femoral neck fracture of the hip     Follow-up and recommended labs and tests   Follow up with  Declan Eaton PA-C at  [facility], within 2 weeks to evaluate after surgery and for hospital follow- up.     Activity   Your activity upon discharge:   Activity as tolerated, no driving until off narcotic pain medication. You may return to work when cleared by your orthopedic surgeon or physician assistant.  Non weight bearing on the left hip for 6-8 weeks.     When to contact your care team   Call your Orthopedic surgeon at [facility]  if you have any of the following: temperature greater than 100.4,  increased shortness of breath, increased drainage, increased swelling or increased pain.     Wound  care and dressings   Instructions to care for your wound at home: as directed, daily dressing changes, ice to area for comfort, keep wound clean and dry and may get incision wet in shower but do not soak or scrub.     Follow-up and recommended labs and tests    Follow up with primary care provider, Steven Duane Semmler, within 7 days for hospital follow- up.  No follow up labs or test are needed.     Diet   Follow this diet upon discharge: Orders Placed This Encounter     High Consistent CHO Diet       Diet   Follow this diet upon discharge: Orders Placed This Encounter     Consistent Carbohydrate Diet           Further instructions from your care team       Orthopedic Surgery Discharge Instructions    1. Follow up:  Follow up with Declan Eaton PA-C.  in 2 weeks for post op check and x rays as scheduled.  Call [phone] if appointment needed or questions. If you have questions or concerns while at home, please call [facility]. If it is an emergency, call [phone]. You may find the answers to questions in the included discharge packet from the hospital or your pre operative packet you received in clinic prior to surgery.    2. Pain Medication:  Use pain medication as directed. If you are prescribed narcotic pain medications (Oxycodone, Norco, Percocet, Tylenol #3, Dilaudid, or Tramadol) then you should try to wean off of them as tolerated. These are an AS NEEDED medication, so if you are not having significant pain you should try to take fewer pills at a time or spread out the doses out over a longer period of time than is written on the prescription. You should not drive a car or operate machinery if you are taking prescribed narcotic pain medication. You may not receive a refill on this medication by your surgical team until your follow up appointment, so try to wean off your narcotics as soon as possible. If you need a refill on this medication you may call your surgical team to discuss during business  hours. Refills are not given on the weekend under any circumstances, so plan accordingly.    3. How to wean narcotics: Within 2-3 days of surgery you should be able to begin weaning your pain medications. If upon leaving the hospital you are taking 2 tabs every 3-4 hours, you should decrease this to 1 tab every 3-4 hours. Around 4-5 days after surgery you should begin decreasing the frequency of your pain medication to every 4-5 hours. You may also cut your pills in half to decrease the dose as you begin the weaning process. Create a weaning schedule of your pain medication when you get home from the hospital, you may be expected to make the prescription last until your next appointment. Call your surgical team during business hours to discuss your pain medication if you have questions or concerns about the weaning process.    4. Tylenol and pain medications: If your pain pill contains Tylenol (i.e. Percocet, Norco, Tylenol #3) and you are also taking additional Tylenol/acetaminophen as a pain medication, ensure that you are not taking too much tylenol. Prescription narcotic medications that contain Tylenol usually have 325mg of Tylenol per pill and over-the-counter medications have variable doses of Tylenol. You should not exceed 4,000mg of Tylenol in a 24-hour period. Tylenol should be used in combination with your pain medication, if able, to help reduce the amount of pain medication you are taking.    5. NSAIDs (anti inflammatory medications): You may take NSAIDs to help control your pain at home.  NSAIDs are Ibuprofen, Motrin, Advil, Aleve, Naprosyn etc. You should use over the counter medications such as NSAIDs and Tylenol to wean off narcotics. If you are also taking Aspirin for blood clot prevention, you should use caution with NSAIDs as this may cause issues such as GI bleeding, upset stomach, and dark stools. Recommended doses of NSAIDs after surgery are 1-2 tabs every 6-8 hours, not to exceed 600 mg per  dose. It is best to rotate Tylenol and NSAIDs if needed every 4 hours. Use these medications in between your narcotics to help reduce the amount of pain medication needed.      6. How to take over the counter medications with pain medications:  Sample medication schedule:   8 am: Pain medication  10 am: Tylenol 500-650 mg (skip if your pain medication contains tylenol, refer to #4)  12 pm: Pain medication  2 pm: NSAIDs 1-2 tabs, not to exceeded 600 mg  4 pm: Pain medication  6 pm: Tylenol 500-650 mg (skip if your pain medication contains tylenol, refer to #4)  8 pm: Pain medication    7. Applying ice to your incision: ice can provide additional pain relief at home. Apply  ice in 20 minute intervals. Allow your skin to warm up to room temperature, approximately 40 minutes, before applying another ice pack.    8. Incision instructions:  Keep your incision clean, covered and dry until your post op appointment.  You may shower and get the incision wet if no drainage is present.  You may change your dressing as needed.      9. Blood Clot Prevention: Take Aspirin 325 mg  daily  for 42 days for anticoagulation. If you develop abdominal pain or have signs of bleeding - blood in stool or black stools, stop taking the medication and seek medical care. Walk often at home, walking will help reduce the risk of blood clots. If you have been prescribed roland stockings or compression stockings, wear them during the day until you follow up with your orthopedic team in clinic. If you were not given Roland Stockings in the hospital but would like them, they can be purchased over the counter at your local pharmacy.     10. Call the office if you have any questions/concerns or are experiencing the following:  - increasing drainage from the incision  - foul-smelling or malodorous drainage from the incision  - sudden increase or change in pain that is not controlled by your prescribed medications  - inability to urinate  - new onset of  weakness, numbness or severe pain in the extremities  - bowel/bladder incontinence  - Fever greater than 101.5 degrees  - Nausea/vomitting causing inability to eat food or medications           Discharge Medications   Current Discharge Medication List      START taking these medications    Details   acetaminophen (TYLENOL) 325 MG tablet Take 2 tablets (650 mg) by mouth every 4 hours as needed for mild pain  Qty: 100 tablet, Refills: 0    Associated Diagnoses: Closed fracture of neck of left femur, initial encounter (H)      baclofen (LIORESAL) 10 MG tablet Take 1 tablet (10 mg) by mouth 3 times daily  Qty: 20 tablet, Refills: 1    Associated Diagnoses: Closed fracture of neck of left femur, initial encounter (H)      !! order for DME Equipment being ordered: shower chair with handles, toilet riser  Qty: 1 Units, Refills: 0    Associated Diagnoses: Closed fracture of neck of left femur, initial encounter (H)      !! order for DME Equipment being ordered: Walker Wheels () and Walker ()  Treatment Diagnosis: s/p L hip fx  Qty: 1 Units, Refills: 0    Associated Diagnoses: Closed fracture of neck of left femur, initial encounter (H)      oxyCODONE IR (ROXICODONE) 5 MG tablet Take 1-2 tablets (5-10 mg) by mouth every 3 hours as needed for moderate to severe pain or other  Qty: 30 tablet, Refills: 0    Associated Diagnoses: Closed fracture of neck of left femur, initial encounter (H)      senna-docusate (SENOKOT-S;PERICOLACE) 8.6-50 MG per tablet Take 2 tablets by mouth 2 times daily  Qty: 100 tablet, Refills: 0    Associated Diagnoses: Closed fracture of neck of left femur, initial encounter (H)       !! - Potential duplicate medications found. Please discuss with provider.      CONTINUE these medications which have CHANGED    Details   aspirin 325 MG EC tablet Take 1 tablet (325 mg) by mouth daily Resume 81 mg Aspirin upon completion  Qty: 42 tablet, Refills: 0    Associated Diagnoses: Closed fracture of neck  of left femur, initial encounter (H)         CONTINUE these medications which have NOT CHANGED    Details   Atorvastatin Calcium (LIPITOR PO) Take 40 mg by mouth daily      blood glucose monitoring (CONTOUR NEXT TEST) test strip by In Vitro route 2 times daily      citalopram (CELEXA) 40 MG tablet Take 40 mg by mouth daily      empagliflozin-linagliptin (GLYXAMBI) 10-5 MG TABS per tablet Take 1 tablet by mouth every morning      IBUPROFEN PO Take 800 mg by mouth as needed for moderate pain      insulin glargine (LANTUS) 100 UNIT/ML injection Inject 42 Units Subcutaneous every morning       lisinopril-hydrochlorothiazide (PRINZIDE/ZESTORETIC) 20-12.5 MG per tablet Take 2 tablets by mouth daily      METFORMIN HCL PO Take 2,000 mg by mouth daily (with breakfast)       Pioglitazone HCl (ACTOS PO) Take 15 mg by mouth daily       varenicline (CHANTIX MEDINA) 0.5 MG X 11 & 1 MG X 42 tablet Days 1-3 take 0.5mg once daily; Days 4-7 take 0.5mg twice daily; then increase to 1mg twice daily. Take with meals.      varenicline (CHANTIX) 1 MG tablet Take 1 mg by mouth 2 times daily (with meals)      acyclovir (ZOVIRAX) 5 % cream Apply topically 5 times daily As needed for cold sores      Sildenafil Citrate (VIAGRA PO) Take 100 mg by mouth      valACYclovir (VALTREX) 1000 mg tablet Take 2 tablets by mouth 2 times daily as needed For cold sores           Allergies   No Known Allergies    Consultations This Hospital Stay   Consultation during this admission received from:    ORTHOPEDIC SURGERY IP CONSULT  OCCUPATIONAL THERAPY ADULT IP CONSULT  PHYSICAL THERAPY ADULT IP CONSULT    Significant Results and Procedures   Procedures  Procedure Date: 06/27/2018       PREOPERATIVE DIAGNOSIS:  Closed impacted left femoral neck fracture.       POSTOPERATIVE DIAGNOSIS:  Closed impacted left femoral neck fracture.       PROCEDURE:  Percutaneous screw fixation x 3, left femoral neck fracture.       SURGEON:  Donnie Tim MD       ASSISTANT:  Declan  NAVYA Chen       ANESTHESIA:  General endotracheal.       ESTIMATED BLOOD LOSS:  Minimal.       COMPLICATIONS:  None apparent skin.       INDICATIONS:  Florencio is a 63-year-old male who took a fall on the evening of 06/26/2018 and sustained a left closed femoral neck fracture.  Alternatives, risks, and possible complications were extremely carefully discussed prior to obtaining operative consent.       OPERATION:  The patient was brought to main operating room.  After general anesthesia was obtained, positioned supine on the fracture table.  Then 2 grams of Ancef was given intravenously.  Left lower extremity was prepped and draped in usual sterile fashion and fluoroscopic visualization of the fracture which verified to be anatomically position.       A small incision was made over the lateral aspect of the cortex of the skin just distal to the greater trochanter.  Subcutaneous tissue was divided sharply.  I then placed 3 Steinmann pins under fluoroscopic guidance in a triangular fashion to the femoral head.  I then carefully measured them and placed partially threaded titanium screws, again just staying shy of the subchondral plate.  Fluoroscopy was used to verify anatomic position of the fracture as well as stable placement of the hardware.  Pins were subsequently removed and the wound was irrigated out.  I then closed the subcutaneous tissue with 2-0 Vicryl suture and completed closure with 3-0 Stratafix.  Sterile bandage was applied.  The patient was awakened and returned to PAR in stable condition, without apparent complication.           FLASH HORNE MD          Data   Results for orders placed or performed during the hospital encounter of 06/26/18   Pelvis XR w/ unilateral hip left    Narrative    PELVIS WITH LEFT HIP LATERAL TWO VIEWS  6/26/2018 6:27 PM     HISTORY: Hip pain, jump from ladder.     COMPARISON: None.      Impression    IMPRESSION: There is a probable fracture through the left femoral  neck  which probably extends into the intertrochanteric region. If  indicated, CT might be helpful in further evaluation.    SOWMYA CAT MD   Lumbar spine XR, 2-3 views    Narrative    LUMBAR SPINE TWO TO THREE VIEWS 6/26/2018 8:38 PM     HISTORY: Fall from ladder, left hip fracture.     COMPARISON: None.      Impression    IMPRESSION: Five functional lumbar vertebral segments in addition to a  probable transitional lumbosacral level. Normal alignment. No  significant disc space narrowing. No acute bony abnormality. Vascular  calcifications are seen.    MINDY JENNINGS MD   XR Surgery OFE L/T 5 Min Fluoro w Stills    Narrative    SURGERY C-ARM FLUOROSCOPY LESS THAN FIVE MINUTES WITH STILLS 6/27/2018  10:30 AM     COMPARISON: 6/26/2018    HISTORY: ORIF Hip nailing.     NUMBER OF IMAGES ACQUIRED: 2    VIEWS: 2    FLUOROSCOPY TIME: .59      Impression    IMPRESSION: Intraoperative views during placement of three  percutaneous screws across a left femoral neck fracture.    ESTEBAN VALDEZ MD       History of Present Illness   (From H&P) Calos Callahan is a 63 year old male with the above past medical history now presents on 6/26/2018 after falling 5 feet from a ladder. Patient lost his balance and fell, denies dizziness, weakness, or syncope. Landed on his left foot with his leg outstretched, then rolled. Had instant left hip pain after fall, unable to bear weight. Also scraped his left arm and has some low back pain.     No knee, ankle, or foot pain. Denies any new numbness or tingling after injury. Denies hitting his head or loss of consciousness.     Prior to fall had been at his baseline health. Has recently lost about 15 pounds since starting a job requiring more physical labor. Denies any recent illness, fever, chills, appetite changes, sleep disturbance, chest pain, palpitations, edema, cough, wheeze, shortness of breath, abdominal pain, nausea, vomiting, diarrhea, constipation, melena, hematochezia,  dysuria, weakness, skin changes or rash, pain other than previously mentioned, headache, vision changes, dizziness, lightheadedness, syncope, numbness, tingling, generalized weakness, confusion, or slurred speech.    Hospital Course   Calos Callahan was admitted on 6/26/2018.  The following problems were addressed during his hospitalization:    Fracture of femoral neck, left (H)  Occurred from falling off ladder. X-ray pelvis and hip showied left femoral neck fracture, probably extending to intertrochanteric region.  Is S/P percutaneous screw fixation x3, Dr. Tim, today is POD #2.  Patient recovering well both by his report and per the Ortho note.  He has been cleared for discharge home per Ortho.  - Home today.  - Analgesia, DVT prophylaxis, activity per Orthopedics orders elsewhere in this document.          Aortic valve stenosis  Bicuspid aortic valve  Previously diagnosed. Follows with Dr. Douglas at [facility]. No history of valve surgery, it was not recommended after patient's last visit in January 2018. Yearly echocardiograms, most recently in Jan 2018:  1. Normal left ventricular chamber size. Normal left ventricular systolic function. No regional wall motion abnormalities.  Moderate concentric increase in left ventricular wall thickness.  Estimated left ventricular ejection fraction is 65-70%.   2. Normal right ventricular chamber size. Normal right ventricular systolic function.   3. Bicuspid aortic valve. Diffuse thickening and calcification of the aortic valve cusps with   reduced excursion. Moderate-to-severe aortic stenosis. Mean transvalvular gradient is 35.1 mmHg. This is probably being underestimated secondary to technical issues. The aortic valve area is 1.2 cm2 and the dimensionless index is 0.3.   4. Mildly dilated ascending aorta (4.0cm).   5. When compared to the previous echocardiographic images of 02/08/2017, there has been no   significant change. The mean gradeint is lower though this  is probably related to technical reasons rather then a true clinical chnage.     No prior to admission problems with angina, syncope/presyncope, or CHF.  There was no evidence of CHF decompensation during this stay, and he had no O2 needs.   - Discharge on usual lisinopril/HCTZ.     Leukocytosis  Presented with WBC 17.2, left shift. Afebrile. No evidence for infection. Likely stress response. WBC normal as of 6/27/18 AM.  - Problem resolved.      Hypokalemia  Presented with potassium 3.3, normal today at 4.2 after replacement.      Long-term insulin use in type 2 diabetes (H)  Most recent HgbA1c 6.7. Taking metformin, Actos, Glyxambi, and Lantus (42 U q hs) to manage.  Had a glucose spike after a DQ Blizzard this first post-op day, control has been good since then.  - Continue Lantus, metformin, Actos, and Glyxambi after discharge, doses unchanged from pre-admission.          Benign essential hypertension  Good BP control here.  - Continue lisinopril-hydrochlorothiazide as prior to admission.          Mixed hyperlipidemia  Taking Lipitor prior to admission, continue.          Adjustment disorder with mixed anxiety and depressed mood  Taking Celexa prior to admission, continue.          Tobacco dependence syndrome  Daily cigar use, trying to quit. Was prescribed Chantix last week but has not yet started. Declined nicotine replacement this hospital stay.      ED (erectile dysfunction)  Taking prn Viagra prior to admission, resume.      Pulmonary nodules  Noted in 2014, had repeat CT in 2016. Appears to be overdue for repeat imaging.  - Defer ongoing monitoring to outpatient.    Pending Results   Unresulted Labs Ordered in the Past 30 Days of this Admission     No orders found from 4/27/2018 to 6/27/2018.          Physical Exam   Temp:  [97.3  F (36.3  C)-98.5  F (36.9  C)] 97.9  F (36.6  C)  Heart Rate:  [56-63] 63  Resp:  [16-18] 18  BP: (107-146)/(58-83) 140/83  SpO2:  [96 %-97 %] 96 %  Vitals:    06/26/18 1731    Weight: 94.3 kg (208 lb)       GENERAL: Pleasant man, sitting up in bed, looks comfortable.  EYES: Eyes grossly normal to inspection, extraocular movements intact  HENT: Nares patent bilaterally.  Nasal mucosa normal, no discharge.    NECK: Trachea midline, no stridor.   RESP: No accessory muscle use.  Symmetrical breath sounds.  Lungs clear anteriorly on inspiration and expiration.  Expiration not prolonged, no wheeze.  CV: Regular rate and rhythm, non-tachycardic.  Normal S1 S2, grade III/VI midsystolic murmur heard best over upper sternal borders, no extra sound.  No lower extremity edema.  ABDOMEN: Soft, non-tender, no guarding.  Liver and spleen not enlarged, no masses palpable.  Bowel sounds positive.  MS: No bony deformities noted.  No red or inflamed joints.  Operative site not examined.  SKIN: Warm and dry, no rashes where skin visible.  NEURO: Alert, oriented, conversant.  Cranial nerves II - XII grossly intact.  No gross motor or sensory deficits.    PSYCH: Calm, alert, conversant.  Able to articulate logical thoughts, no tangential thoughts, no hallucinations or delusions.  Affect normal.    The discharge plan was discussed with the patient and his wife.    Total time on this discharge was 20 minutes.    Melvin Rajput MD

## 2018-06-29 NOTE — PHARMACY - DISCHARGE MEDICATION RECONCILIATION
Discharge medication review for this patient is complete. Pharmacist assisted with medication reconciliation of discharge medications with prior to admission medications.     The following changes were made to the discharge medication list based on pharmacist review:  Added:  Oxycodone, senna, acetaminophen  Discontinued: NA  Changed: increase aspirin to 325 mg daily      Patient's Discharge Medication List  - medications as listed on After Visit Summary (AVS)     Review of your medicines      START taking       Dose / Directions    acetaminophen 325 MG tablet   Commonly known as:  TYLENOL        Dose:  650 mg   Take 2 tablets (650 mg) by mouth every 4 hours as needed for mild pain   Quantity:  100 tablet   Refills:  0       order for DME        Equipment being ordered: Walker Wheels () and Walker () Treatment Diagnosis: s/p L hip fx   Quantity:  1 Units   Refills:  0       oxyCODONE IR 5 MG tablet   Commonly known as:  ROXICODONE        Dose:  5-10 mg   Take 1-2 tablets (5-10 mg) by mouth every 3 hours as needed for moderate to severe pain or other   Quantity:  30 tablet   Refills:  0       senna-docusate 8.6-50 MG per tablet   Commonly known as:  SENOKOT-S;PERICOLACE        Dose:  2 tablet   Take 2 tablets by mouth 2 times daily   Quantity:  100 tablet   Refills:  0         CONTINUE these medicines which may have CHANGED, or have new prescriptions. If we are uncertain of the size of tablets/capsules you have at home, strength may be listed as something that might have changed.       Dose / Directions    aspirin 325 MG EC tablet   This may have changed:    - medication strength  - how much to take  - additional instructions        Dose:  325 mg   Take 1 tablet (325 mg) by mouth daily Resume 81 mg Aspirin upon completion   Quantity:  42 tablet   Refills:  0         CONTINUE these medicines which have NOT CHANGED       Dose / Directions    ACTOS PO        Dose:  15 mg   Take 15 mg by mouth daily   Refills:   0       acyclovir 5 % cream   Commonly known as:  ZOVIRAX        Apply topically 5 times daily As needed for cold sores   Refills:  0       citalopram 40 MG tablet   Commonly known as:  celeXA        Dose:  40 mg   Take 40 mg by mouth daily   Refills:  0       CONTOUR NEXT TEST test strip   Generic drug:  blood glucose monitoring        by In Vitro route 2 times daily   Refills:  0       empagliflozin-linagliptin 10-5 MG Tabs per tablet   Commonly known as:  GLYXAMBI        Dose:  1 tablet   Take 1 tablet by mouth every morning   Refills:  0       IBUPROFEN PO        Dose:  800 mg   Take 800 mg by mouth as needed for moderate pain   Refills:  0       insulin glargine 100 UNIT/ML injection   Commonly known as:  LANTUS        Dose:  42 Units   Inject 42 Units Subcutaneous every morning   Refills:  0       LIPITOR PO        Dose:  40 mg   Take 40 mg by mouth daily   Refills:  0       lisinopril-hydrochlorothiazide 20-12.5 MG per tablet   Commonly known as:  PRINZIDE/ZESTORETIC        Dose:  2 tablet   Take 2 tablets by mouth daily   Refills:  0       METFORMIN HCL PO        Dose:  2000 mg   Take 2,000 mg by mouth daily (with breakfast)   Refills:  0       valACYclovir 1000 mg tablet   Commonly known as:  VALTREX        Dose:  2 tablet   Take 2 tablets by mouth 2 times daily as needed For cold sores   Refills:  0       * varenicline 0.5 MG X 11 & 1 MG X 42 tablet   Commonly known as:  CHANTIX MEDINA        Days 1-3 take 0.5mg once daily; Days 4-7 take 0.5mg twice daily; then increase to 1mg twice daily. Take with meals.   Refills:  0       * varenicline 1 MG tablet   Commonly known as:  CHANTIX        Dose:  1 mg   Take 1 mg by mouth 2 times daily (with meals)   Refills:  0       VIAGRA PO   Indication:  Erectile Dysfunction        Dose:  100 mg   Take 100 mg by mouth   Refills:  0       * Notice:  This list has 2 medication(s) that are the same as other medications prescribed for you. Read the directions carefully, and  ask your doctor or other care provider to review them with you.         Where to get your medicines      These medications were sent to [facility] [place], [place] - [address]  [address] [address]     Phone:  [phone]      acetaminophen 325 MG tablet     aspirin 325 MG EC tablet         Some of these will need a paper prescription and others can be bought over the counter. Ask your nurse if you have questions.     Bring a paper prescription for each of these medications      order for DME     oxyCODONE IR 5 MG tablet       You don't need a prescription for these medications      senna-docusate 8.6-50 MG per tablet

## 2018-07-05 NOTE — ADDENDUM NOTE
Addendum  created 07/05/18 1330 by Manuela Omer APRN CRNA    Anesthesia Event edited, Procedure Event Log accessed

## 2018-08-13 ENCOUNTER — HOSPITAL ENCOUNTER (OUTPATIENT)
Dept: PHYSICAL THERAPY | Facility: CLINIC | Age: 63
Setting detail: THERAPIES SERIES
End: 2018-08-13
Attending: ORTHOPAEDIC SURGERY
Payer: OTHER MISCELLANEOUS

## 2018-08-13 PROCEDURE — 40000718 ZZHC STATISTIC PT DEPARTMENT ORTHO VISIT: Performed by: PHYSICAL THERAPIST

## 2018-08-13 PROCEDURE — 97116 GAIT TRAINING THERAPY: CPT | Mod: GP | Performed by: PHYSICAL THERAPIST

## 2018-08-14 NOTE — PROGRESS NOTES
"Physical Therapy Initial Crutch Training Evaluation     08/13/18 0900   General Information   Type of Visit Initial OP Ortho PT Evaluation   Start of Care Date 08/13/18   Referring Physician Donnie Tim MD   Patient/Family Goals Statement Safely walk with Crutches   Orders Evaluate and Treat   Date of Order 08/13/18   Insurance Type Other   Insurance Comments/Visits Authorized    Medical Diagnosis (L) Femoral Neck fx with pinning 6/26/18   Surgical/Medical history reviewed Yes   Precautions/Limitations other (see comments)  (PWB)   Weight-Bearing Status - LLE partial weight-bearing (% in comments)  (20lbs 1st week, 50lbs 2nd week)   General Information Comments Pt told \"no exercising yet, just crutch trainning.\"    Body Part(s)   Body Part(s) Hip   Presentation and Etiology   Pertinent history of current problem (include personal factors and/or comorbidities that impact the POC) Fell/jumped off ladder at work on 6/26/18 and sustained a (L) femoral fracture that required repair.  Just saw MD today and got clearance for PWB and use of crutches.   Hip Objective Findings   Side (if bilateral, select both right and left) Left   Observation No acute distress noted   Integumentary  Healed incisions (L) hip   Posture WFL   Gait/Locomotion Presented in    Hip ROM Comments Not formally assessed as this was crutch training session only.   Hip/Knee Strength Comments Not formally assessed as this was crutch training session only.   Planned Therapy Interventions   Planned Therapy Interventions gait training   Clinical Impression   Criteria for Skilled Therapeutic Interventions Met yes, treatment indicated   PT Diagnosis Difficulty in ambulation   Influenced by the following impairments Restricted WB'ing, pt had never used crutches in the past   Functional limitations due to impairments Gait using Crutches   Clinical Presentation Stable/Uncomplicated   Clinical Presentation Rationale New health issue, predictable symptoms, " minimal comorbidities   Clinical Decision Making (Complexity) Low complexity   Therapy Frequency 1 time/week   Predicted Duration of Therapy Intervention (days/wks) 1 week for one session   Risk & Benefits of therapy have been explained Yes   Patient, Family & other staff in agreement with plan of care Yes   Clinical Impression Comments Pt presents with (L) femoral fx and new order to begin PWB (20#) and has never used crutches. Pt could benefit from skilled PT to learn to safely use crutches.    Education Assessment   Preferred Learning Style Listening;Reading;Demonstration   Ortho Goal 1   Goal Identifier STG/LTG   Goal Description 1)Pt will safely demonstrate use of crutches on level and stairs, in 1 week.   Target Date 08/20/18   Date Met 08/13/18   Ortho Goal 2   Goal Identifier STG/LTG   Goal Description 2)Pt will verbalize understanding use of a scale to guage wt on (L) foot and how to progress per MD order, in one week.   Target Date 08/21/18   Date Met 08/13/18   Total Evaluation Time   Total Evaluation Time 7   Thank you for the referral of this patient.  Elise Yi, PT, MA  #[id number]

## 2019-01-10 RX ORDER — VALACYCLOVIR HYDROCHLORIDE 1 G/1
TABLET, FILM COATED ORAL
COMMUNITY
Start: 2018-11-02

## 2019-01-11 ENCOUNTER — ANESTHESIA (OUTPATIENT)
Dept: SURGERY | Facility: CLINIC | Age: 64
End: 2019-01-11
Payer: OTHER MISCELLANEOUS

## 2019-01-11 ENCOUNTER — ANESTHESIA EVENT (OUTPATIENT)
Dept: SURGERY | Facility: CLINIC | Age: 64
End: 2019-01-11
Payer: OTHER MISCELLANEOUS

## 2019-01-11 ENCOUNTER — HOSPITAL ENCOUNTER (OUTPATIENT)
Facility: CLINIC | Age: 64
Discharge: HOME | End: 2019-01-11
Attending: ORTHOPAEDIC SURGERY | Admitting: ORTHOPAEDIC SURGERY
Payer: OTHER MISCELLANEOUS

## 2019-01-11 VITALS
DIASTOLIC BLOOD PRESSURE: 74 MMHG | RESPIRATION RATE: 16 BRPM | WEIGHT: 205 LBS | SYSTOLIC BLOOD PRESSURE: 128 MMHG | OXYGEN SATURATION: 95 % | BODY MASS INDEX: 26.31 KG/M2 | HEIGHT: 74 IN | HEART RATE: 55 BPM | TEMPERATURE: 97.7 F

## 2019-01-11 LAB
GLUCOSE BLDC GLUCOMTR-MCNC: 120 MG/DL (ref 70–99)
GLUCOSE BLDC GLUCOMTR-MCNC: 131 MG/DL (ref 70–99)

## 2019-01-11 PROCEDURE — 25000128 H RX IP 250 OP 636: Performed by: PHYSICIAN ASSISTANT

## 2019-01-11 PROCEDURE — 71000027 ZZH RECOVERY PHASE 2 EACH 15 MINS: Performed by: ORTHOPAEDIC SURGERY

## 2019-01-11 PROCEDURE — 71000012 ZZH RECOVERY PHASE 1 LEVEL 1 FIRST HR: Performed by: ORTHOPAEDIC SURGERY

## 2019-01-11 PROCEDURE — 82962 GLUCOSE BLOOD TEST: CPT

## 2019-01-11 PROCEDURE — 25000128 H RX IP 250 OP 636: Performed by: NURSE ANESTHETIST, CERTIFIED REGISTERED

## 2019-01-11 PROCEDURE — 37000009 ZZH ANESTHESIA TECHNICAL FEE, EACH ADDTL 15 MIN: Performed by: ORTHOPAEDIC SURGERY

## 2019-01-11 PROCEDURE — 25000128 H RX IP 250 OP 636: Performed by: ORTHOPAEDIC SURGERY

## 2019-01-11 PROCEDURE — 36000063 ZZH SURGERY LEVEL 4 EA 15 ADDTL MIN: Performed by: ORTHOPAEDIC SURGERY

## 2019-01-11 PROCEDURE — 36000065 ZZH SURGERY LEVEL 4 W FLUORO 1ST 30 MIN: Performed by: ORTHOPAEDIC SURGERY

## 2019-01-11 PROCEDURE — 27210794 ZZH OR GENERAL SUPPLY STERILE: Performed by: ORTHOPAEDIC SURGERY

## 2019-01-11 PROCEDURE — 37000008 ZZH ANESTHESIA TECHNICAL FEE, 1ST 30 MIN: Performed by: ORTHOPAEDIC SURGERY

## 2019-01-11 PROCEDURE — 25000125 ZZHC RX 250: Performed by: ORTHOPAEDIC SURGERY

## 2019-01-11 PROCEDURE — 40000305 ZZH STATISTIC PRE PROC ASSESS I: Performed by: ORTHOPAEDIC SURGERY

## 2019-01-11 RX ORDER — KETOROLAC TROMETHAMINE 30 MG/ML
30 INJECTION, SOLUTION INTRAMUSCULAR; INTRAVENOUS EVERY 6 HOURS PRN
Status: DISCONTINUED | OUTPATIENT
Start: 2019-01-11 | End: 2019-01-11 | Stop reason: HOSPADM

## 2019-01-11 RX ORDER — FENTANYL CITRATE 50 UG/ML
INJECTION, SOLUTION INTRAMUSCULAR; INTRAVENOUS PRN
Status: DISCONTINUED | OUTPATIENT
Start: 2019-01-11 | End: 2019-01-11

## 2019-01-11 RX ORDER — FENTANYL CITRATE 50 UG/ML
25-50 INJECTION, SOLUTION INTRAMUSCULAR; INTRAVENOUS
Status: DISCONTINUED | OUTPATIENT
Start: 2019-01-11 | End: 2019-01-11 | Stop reason: HOSPADM

## 2019-01-11 RX ORDER — DEXAMETHASONE SODIUM PHOSPHATE 4 MG/ML
INJECTION, SOLUTION INTRA-ARTICULAR; INTRALESIONAL; INTRAMUSCULAR; INTRAVENOUS; SOFT TISSUE PRN
Status: DISCONTINUED | OUTPATIENT
Start: 2019-01-11 | End: 2019-01-11

## 2019-01-11 RX ORDER — SODIUM CHLORIDE, SODIUM LACTATE, POTASSIUM CHLORIDE, CALCIUM CHLORIDE 600; 310; 30; 20 MG/100ML; MG/100ML; MG/100ML; MG/100ML
INJECTION, SOLUTION INTRAVENOUS CONTINUOUS
Status: DISCONTINUED | OUTPATIENT
Start: 2019-01-11 | End: 2019-01-11 | Stop reason: HOSPADM

## 2019-01-11 RX ORDER — NALOXONE HYDROCHLORIDE 0.4 MG/ML
.1-.4 INJECTION, SOLUTION INTRAMUSCULAR; INTRAVENOUS; SUBCUTANEOUS
Status: DISCONTINUED | OUTPATIENT
Start: 2019-01-11 | End: 2019-01-11 | Stop reason: HOSPADM

## 2019-01-11 RX ORDER — CEFAZOLIN SODIUM 2 G/100ML
2 INJECTION, SOLUTION INTRAVENOUS
Status: COMPLETED | OUTPATIENT
Start: 2019-01-11 | End: 2019-01-11

## 2019-01-11 RX ORDER — ALBUTEROL SULFATE 0.83 MG/ML
2.5 SOLUTION RESPIRATORY (INHALATION) EVERY 4 HOURS PRN
Status: DISCONTINUED | OUTPATIENT
Start: 2019-01-11 | End: 2019-01-11 | Stop reason: HOSPADM

## 2019-01-11 RX ORDER — HYDROMORPHONE HYDROCHLORIDE 1 MG/ML
.3-.5 INJECTION, SOLUTION INTRAMUSCULAR; INTRAVENOUS; SUBCUTANEOUS EVERY 10 MIN PRN
Status: DISCONTINUED | OUTPATIENT
Start: 2019-01-11 | End: 2019-01-11 | Stop reason: HOSPADM

## 2019-01-11 RX ORDER — ONDANSETRON 4 MG/1
4 TABLET, ORALLY DISINTEGRATING ORAL EVERY 30 MIN PRN
Status: DISCONTINUED | OUTPATIENT
Start: 2019-01-11 | End: 2019-01-11 | Stop reason: HOSPADM

## 2019-01-11 RX ORDER — MEPERIDINE HYDROCHLORIDE 25 MG/ML
12.5 INJECTION INTRAMUSCULAR; INTRAVENOUS; SUBCUTANEOUS
Status: DISCONTINUED | OUTPATIENT
Start: 2019-01-11 | End: 2019-01-11 | Stop reason: HOSPADM

## 2019-01-11 RX ORDER — PROPOFOL 10 MG/ML
INJECTION, EMULSION INTRAVENOUS PRN
Status: DISCONTINUED | OUTPATIENT
Start: 2019-01-11 | End: 2019-01-11

## 2019-01-11 RX ORDER — ONDANSETRON 2 MG/ML
4 INJECTION INTRAMUSCULAR; INTRAVENOUS EVERY 30 MIN PRN
Status: DISCONTINUED | OUTPATIENT
Start: 2019-01-11 | End: 2019-01-11 | Stop reason: HOSPADM

## 2019-01-11 RX ORDER — CEFAZOLIN SODIUM 1 G/50ML
1 INJECTION, SOLUTION INTRAVENOUS SEE ADMIN INSTRUCTIONS
Status: DISCONTINUED | OUTPATIENT
Start: 2019-01-11 | End: 2019-01-11 | Stop reason: HOSPADM

## 2019-01-11 RX ORDER — KETOROLAC TROMETHAMINE 30 MG/ML
INJECTION, SOLUTION INTRAMUSCULAR; INTRAVENOUS PRN
Status: DISCONTINUED | OUTPATIENT
Start: 2019-01-11 | End: 2019-01-11

## 2019-01-11 RX ORDER — ONDANSETRON 2 MG/ML
INJECTION INTRAMUSCULAR; INTRAVENOUS PRN
Status: DISCONTINUED | OUTPATIENT
Start: 2019-01-11 | End: 2019-01-11

## 2019-01-11 RX ADMIN — MIDAZOLAM 2 MG: 1 INJECTION INTRAMUSCULAR; INTRAVENOUS at 12:00

## 2019-01-11 RX ADMIN — FENTANYL CITRATE 50 MCG: 50 INJECTION, SOLUTION INTRAMUSCULAR; INTRAVENOUS at 12:40

## 2019-01-11 RX ADMIN — SODIUM CHLORIDE, POTASSIUM CHLORIDE, SODIUM LACTATE AND CALCIUM CHLORIDE: 600; 310; 30; 20 INJECTION, SOLUTION INTRAVENOUS at 12:00

## 2019-01-11 RX ADMIN — ONDANSETRON 4 MG: 2 INJECTION INTRAMUSCULAR; INTRAVENOUS at 12:34

## 2019-01-11 RX ADMIN — Medication 0.5 MG: at 14:16

## 2019-01-11 RX ADMIN — KETOROLAC TROMETHAMINE 30 MG: 30 INJECTION, SOLUTION INTRAMUSCULAR at 12:34

## 2019-01-11 RX ADMIN — DEXAMETHASONE SODIUM PHOSPHATE 4 MG: 4 INJECTION, SOLUTION INTRA-ARTICULAR; INTRALESIONAL; INTRAMUSCULAR; INTRAVENOUS; SOFT TISSUE at 12:34

## 2019-01-11 RX ADMIN — CEFAZOLIN SODIUM 2 G: 2 INJECTION, SOLUTION INTRAVENOUS at 12:00

## 2019-01-11 RX ADMIN — Medication 0.5 MG: at 13:53

## 2019-01-11 RX ADMIN — FENTANYL CITRATE 50 MCG: 50 INJECTION, SOLUTION INTRAMUSCULAR; INTRAVENOUS at 12:45

## 2019-01-11 RX ADMIN — LIDOCAINE HYDROCHLORIDE 50 MG: 10 INJECTION, SOLUTION EPIDURAL; INFILTRATION; INTRACAUDAL; PERINEURAL at 12:05

## 2019-01-11 RX ADMIN — FENTANYL CITRATE 150 MCG: 50 INJECTION, SOLUTION INTRAMUSCULAR; INTRAVENOUS at 12:00

## 2019-01-11 RX ADMIN — PROPOFOL 200 MG: 10 INJECTION, EMULSION INTRAVENOUS at 12:08

## 2019-01-11 ASSESSMENT — MIFFLIN-ST. JEOR: SCORE: 1791.68

## 2019-01-11 ASSESSMENT — LIFESTYLE VARIABLES: TOBACCO_USE: 1

## 2019-01-11 NOTE — ANESTHESIA POSTPROCEDURE EVALUATION
Patient: Calos Callahan    Procedure(s):  Hardware removal hip    Diagnosis:Painful hardware  Diagnosis Additional Information: No value filed.    Anesthesia Type:  No value filed.    Note:  Anesthesia Post Evaluation    Patient location during evaluation: Bedside  Patient participation: Able to fully participate in evaluation  Level of consciousness: awake and alert  Pain management: adequate  Airway patency: patent  Cardiovascular status: acceptable  Respiratory status: acceptable  Hydration status: acceptable  PONV: none     Anesthetic complications: None          Last vitals:  Vitals:    01/11/19 1300 01/11/19 1315 01/11/19 1322   BP: (!) 134/98 136/82 123/87   Pulse:      Resp: 16 16    Temp:      SpO2: 97% 97% 97%         Electronically Signed By: Nicolas Maldonado CRNA, APRN CRNA  January 11, 2019  1:29 PM

## 2019-01-11 NOTE — DISCHARGE INSTRUCTIONS
Same Day Surgery Discharge Instructions  Special Precautions After Surgery - Adult    1. It is not unusual to feel lightheaded or faint, up to 24 hours after surgery or while taking pain medication.  If you have these symptoms; sit for a few minutes before standing and have someone assist you when getting up.  2. You should rest and relax for the next 24 hours and must have someone stay with you for at least 24 hours after your discharge.  3. DO NOT DRIVE any vehicle or operate mechanical equipment for 24 hours following the end of your surgery.  DO NOT DRIVE while taking narcotic pain medications that have been prescribed by your physician.  If you had a limb operated on, you must be able to use it fully to drive.  4. DO NOT drink alcoholic beverages for 24 hours following surgery or while taking prescription pain medication.  5. Drink clear liquids (apple juice, ginger ale, broth, 7-Up, etc.).  Progress to your regular diet as you feel able.  6. Any questions call your physician and do not make important decisions for 24 hours.    Start your pain pills when needed.    [facility]:  [phone]       Same Day Surgery [phone], Monday thru Friday 6am-9pm.    Break through Bleeding  As instructed per Surgeon or Nurse.    Post Op Infection  Be alert for signs of infection: redness, swelling, heat, drainage of pus, and/or elevated temperature.  Contact your surgeon if these occur.    Nausea   If post op nausea occurs, at first rest your stomach for a few hours by eating nothing solid and sipping only clear liquids.  Call your Surgeon if nausea does not resolve in 24 hours.

## 2019-01-11 NOTE — ANESTHESIA CARE TRANSFER NOTE
Patient: Calos Callahan    Procedure(s):  Hardware removal hip    Diagnosis: Painful hardware  Diagnosis Additional Information: No value filed.    Anesthesia Type:   No value filed.     Note:  Airway :Face Mask  Patient transferred to:PACU  Handoff Report: Identifed the Patient, Identified the Reponsible Provider, Reviewed the pertinent medical history, Discussed the surgical course, Reviewed Intra-OP anesthesia mangement and issues during anesthesia, Set expectations for post-procedure period and Allowed opportunity for questions and acknowledgement of understanding      Vitals: (Last set prior to Anesthesia Care Transfer)    CRNA VITALS  1/11/2019 1223 - 1/11/2019 1254      1/11/2019             SpO2:  99 %    EKG:  NSR                Electronically Signed By: Nicolas Maldonado CRNA, APRN CRNA  January 11, 2019  12:54 PM

## 2019-01-11 NOTE — ANESTHESIA PREPROCEDURE EVALUATION
Anesthesia Pre-Procedure Evaluation    Patient: Calos Callahan   MRN: [id number] : 1955          Preoperative Diagnosis: Painful hardware    Procedure(s):  Hardware removal hip    Past Medical History:   Diagnosis Date     Depressive disorder      Diabetes (H)      Hypertension      Murmur      Past Surgical History:   Procedure Laterality Date     basal cell - face       OPEN REDUCTION INTERNAL FIXATION HIP NAILING Left 2018    Procedure: OPEN REDUCTION INTERNAL FIXATION HIP NAILING;  Perc Pinning Left Hip;  Surgeon: Donnie Tim MD;  Location: [facility] OR     SHOULDER SURGERY       VASECTOMY         Anesthesia Evaluation     .             ROS/MED HX    ENT/Pulmonary:     (+)tobacco use, Current use , . .    Neurologic:       Cardiovascular:     (+) Dyslipidemia, hypertension----. : . . . :. valvular problems/murmurs type: AS .       METS/Exercise Tolerance:     Hematologic:         Musculoskeletal:         GI/Hepatic:         Renal/Genitourinary:         Endo:     (+) type II DM .      Psychiatric:     (+) psychiatric history depression      Infectious Disease:         Malignancy:         Other:                          Physical Exam  Normal systems: cardiovascular, pulmonary and dental    Airway   Mallampati: II  TM distance: >3 FB  Neck ROM: full    Dental     Cardiovascular       Pulmonary             Lab Results   Component Value Date    WBC 15.5 (H) 2018    HGB 11.4 (L) 2018    HCT 36.7 (L) 2018     2018     2018    POTASSIUM 4.2 2018    CHLORIDE 102 2018    CO2 26 2018    BUN 31 (H) 2018    CR 0.95 2018     (H) 2018    JAMARCUS 8.6 2018       Preop Vitals  BP Readings from Last 3 Encounters:   19 123/87   18 140/83    Pulse Readings from Last 3 Encounters:   19 55   18 57      Resp Readings from Last 3 Encounters:   19 16   18 18    SpO2 Readings from Last 3  "Encounters:   01/11/19 97%   06/29/18 96%      Temp Readings from Last 1 Encounters:   01/11/19 36.5  C (97.7  F) (Axillary)    Ht Readings from Last 1 Encounters:   01/11/19 1.867 m (6' 1.5\")      Wt Readings from Last 1 Encounters:   01/11/19 93 kg (205 lb)    Estimated body mass index is 26.68 kg/m  as calculated from the following:    Height as of this encounter: 1.867 m (6' 1.5\").    Weight as of this encounter: 93 kg (205 lb).       Anesthesia Plan      History & Physical Review  History and physical reviewed and following examination; no interval change.    ASA Status:  2 .    NPO Status:  > 6 hours    Plan for General and LMA with Intravenous induction. Maintenance will be Balanced.           Postoperative Care      Consents  Anesthetic plan, risks, benefits and alternatives discussed with:  Patient..                 Nicolas Maldonado CRNA, APRN CRNA  "

## 2019-01-11 NOTE — BRIEF OP NOTE
[facility]    Brief Operative Note    Pre-operative diagnosis: Painful hardware  Post-operative diagnosis Retained Hardware Left Hip  Procedure: Procedure(s):  Hardware removal hip  Surgeon: Surgeon(s) and Role:     * Donnie Tim MD - Primary     * Declan Yañez PA-C - Assisting  Anesthesia: General   Estimated blood loss: Minimal  Drains: None  Specimens: * No specimens in log *  Findings:   None.  Complications: None.  Implants: None.

## 2019-01-12 NOTE — OP NOTE
Procedure Date: 01/11/2019      PREOPERATIVE DIAGNOSIS:  Painful retained hardware, left hip.      POSTOPERATIVE DIAGNOSIS:  Painful retained hardware, left hip.      PROCEDURE:  Screw removal x3, left hip.      SURGEON:  Flash Tim MD.      ASSISTANT:  Declan Contreras PA-C.      ANESTHESIA:  General.      ESTIMATED BLOOD LOSS:  Minimal.      COMPLICATIONS:  None apparent.      INDICATIONS:  Florencio is a very pleasant 63-year-old gentleman who had a percutaneous screw fixation for a left femoral neck fracture.  The patient went on to heal uneventfully and after alternatives, risks, and possible complications were carefully discussed, the patient desired surgical intervention.  Consent was obtained.      DESCRIPTION OF PROCEDURE:  The patient was brought to main operating room and after general was obtained, placed in right lateral decubitus position.  Two grams of Ancef given intravenously.  Left lower extremity was prepped and draped in usual sterile fashion.      The old scar was reopened and extended proximally for approximately 5-6 cm.  Subcutaneous tissue divided with cautery.  IT band split in line with the incision.  Small Weitlaner retractor was placed.  I then was able to palpate the screws through the vastus lateralis fascia.  This was also incised in line with the incision.  Three screws were identified without difficulty and removed in their entirety with a power drill.  I irrigated out the wound.  I removed the retractor, closed the vastus lateralis with 2-0 Vicryl suture, closed the IT band with 0 Vicryl suture, completed subcutaneous closure with 2-0 Vicryl suture and completed closure with 3-0 Stratafix.  Marcaine 0.25% was instilled within the wound followed by sterile compression bandages.  The patient was then turned supine, awoken, extubated and returned to [facility] in stable condition, without apparent complication.         FLASH TIM MD             D: 01/11/2019   T: 01/11/2019   MT: [id number]       Name:     GIANFRANCO REAVES   MRN:      [id number]        Account:        [id number]   :      1955           Procedure Date: 2019      Document: [id number]

## 2019-02-17 ENCOUNTER — APPOINTMENT (OUTPATIENT)
Dept: GENERAL RADIOLOGY | Facility: CLINIC | Age: 64
End: 2019-02-17
Attending: EMERGENCY MEDICINE
Payer: COMMERCIAL

## 2019-02-17 ENCOUNTER — HOSPITAL ENCOUNTER (EMERGENCY)
Facility: CLINIC | Age: 64
Discharge: ANOTHER HOSPITAL | End: 2019-02-17
Attending: EMERGENCY MEDICINE | Admitting: EMERGENCY MEDICINE
Payer: COMMERCIAL

## 2019-02-17 VITALS
BODY MASS INDEX: 24.38 KG/M2 | RESPIRATION RATE: 20 BRPM | SYSTOLIC BLOOD PRESSURE: 125 MMHG | DIASTOLIC BLOOD PRESSURE: 80 MMHG | HEIGHT: 74 IN | WEIGHT: 190 LBS | HEART RATE: 79 BPM | OXYGEN SATURATION: 97 %

## 2019-02-17 DIAGNOSIS — I21.4 NSTEMI (NON-ST ELEVATED MYOCARDIAL INFARCTION) (H): ICD-10-CM

## 2019-02-17 LAB
ANION GAP SERPL CALCULATED.3IONS-SCNC: 6 MMOL/L (ref 6–17)
BASOPHILS # BLD AUTO: 0 10E9/L (ref 0–0.2)
BASOPHILS NFR BLD AUTO: 0.3 %
BUN SERPL-MCNC: 25 MG/DL (ref 7–30)
CALCIUM SERPL-MCNC: 7.6 MG/DL (ref 8.5–10.1)
CHLORIDE SERPL-SCNC: 108 MMOL/L (ref 94–109)
CO2 SERPL-SCNC: 23 MMOL/L (ref 20–32)
CREAT SERPL-MCNC: 0.67 MG/DL (ref 0.66–1.25)
DIFFERENTIAL METHOD BLD: ABNORMAL
EOSINOPHIL # BLD AUTO: 0.2 10E9/L (ref 0–0.7)
EOSINOPHIL NFR BLD AUTO: 1.5 %
ERYTHROCYTE [DISTWIDTH] IN BLOOD BY AUTOMATED COUNT: 14.3 % (ref 10–15)
GFR SERPL CREATININE-BSD FRML MDRD: >90 ML/MIN/{1.73_M2}
GLUCOSE SERPL-MCNC: 322 MG/DL (ref 70–99)
HCT VFR BLD AUTO: 40.7 % (ref 40–53)
HGB BLD-MCNC: 13.2 G/DL (ref 13.3–17.7)
IMM GRANULOCYTES # BLD: 0 10E9/L (ref 0–0.4)
IMM GRANULOCYTES NFR BLD: 0.4 %
LYMPHOCYTES # BLD AUTO: 1.1 10E9/L (ref 0.8–5.3)
LYMPHOCYTES NFR BLD AUTO: 10.2 %
MCH RBC QN AUTO: 27.8 PG (ref 26.5–33)
MCHC RBC AUTO-ENTMCNC: 32.4 G/DL (ref 31.5–36.5)
MCV RBC AUTO: 86 FL (ref 78–100)
MONOCYTES # BLD AUTO: 0.5 10E9/L (ref 0–1.3)
MONOCYTES NFR BLD AUTO: 4.4 %
NEUTROPHILS # BLD AUTO: 9.3 10E9/L (ref 1.6–8.3)
NEUTROPHILS NFR BLD AUTO: 83.2 %
NRBC # BLD AUTO: 0 10*3/UL
NRBC BLD AUTO-RTO: 0 /100
NT-PROBNP SERPL-MCNC: 2275 PG/ML (ref 0–900)
PLATELET # BLD AUTO: 187 10E9/L (ref 150–450)
POTASSIUM SERPL-SCNC: 3.7 MMOL/L (ref 3.4–5.3)
RBC # BLD AUTO: 4.74 10E12/L (ref 4.4–5.9)
SODIUM SERPL-SCNC: 137 MMOL/L (ref 133–144)
TROPONIN I SERPL-MCNC: 0.59 UG/L (ref 0–0.04)
WBC # BLD AUTO: 11.2 10E9/L (ref 4–11)

## 2019-02-17 PROCEDURE — 93010 ELECTROCARDIOGRAM REPORT: CPT | Mod: Z6 | Performed by: EMERGENCY MEDICINE

## 2019-02-17 PROCEDURE — 85025 COMPLETE CBC W/AUTO DIFF WBC: CPT | Performed by: EMERGENCY MEDICINE

## 2019-02-17 PROCEDURE — 93005 ELECTROCARDIOGRAM TRACING: CPT

## 2019-02-17 PROCEDURE — 93308 TTE F-UP OR LMTD: CPT

## 2019-02-17 PROCEDURE — 84484 ASSAY OF TROPONIN QUANT: CPT | Performed by: EMERGENCY MEDICINE

## 2019-02-17 PROCEDURE — 96374 THER/PROPH/DIAG INJ IV PUSH: CPT

## 2019-02-17 PROCEDURE — 99285 EMERGENCY DEPT VISIT HI MDM: CPT | Mod: 25

## 2019-02-17 PROCEDURE — 80048 BASIC METABOLIC PNL TOTAL CA: CPT | Performed by: EMERGENCY MEDICINE

## 2019-02-17 PROCEDURE — 25000132 ZZH RX MED GY IP 250 OP 250 PS 637: Performed by: EMERGENCY MEDICINE

## 2019-02-17 PROCEDURE — 96376 TX/PRO/DX INJ SAME DRUG ADON: CPT

## 2019-02-17 PROCEDURE — 83880 ASSAY OF NATRIURETIC PEPTIDE: CPT | Performed by: EMERGENCY MEDICINE

## 2019-02-17 PROCEDURE — 71045 X-RAY EXAM CHEST 1 VIEW: CPT

## 2019-02-17 PROCEDURE — 99285 EMERGENCY DEPT VISIT HI MDM: CPT | Mod: Z6 | Performed by: EMERGENCY MEDICINE

## 2019-02-17 PROCEDURE — 25000128 H RX IP 250 OP 636: Performed by: EMERGENCY MEDICINE

## 2019-02-17 RX ORDER — ASPIRIN 81 MG/1
81 TABLET ORAL DAILY
COMMUNITY

## 2019-02-17 RX ORDER — ACYCLOVIR 50 MG/G
CREAM TOPICAL
COMMUNITY

## 2019-02-17 RX ORDER — ASPIRIN 81 MG/1
324 TABLET, CHEWABLE ORAL ONCE
Status: COMPLETED | OUTPATIENT
Start: 2019-02-17 | End: 2019-02-17

## 2019-02-17 RX ORDER — PIOGLITAZONEHYDROCHLORIDE 15 MG/1
15 TABLET ORAL DAILY
COMMUNITY

## 2019-02-17 RX ADMIN — HEPARIN SODIUM 12 UNITS/KG/HR: 10000 INJECTION, SOLUTION INTRAVENOUS at 04:51

## 2019-02-17 RX ADMIN — Medication 5000 UNITS: at 04:51

## 2019-02-17 RX ADMIN — ASPIRIN 81 MG 324 MG: 81 TABLET ORAL at 04:49

## 2019-02-17 ASSESSMENT — MIFFLIN-ST. JEOR: SCORE: 1731.58

## 2019-02-17 NOTE — ED PROVIDER NOTES
History     Chief Complaint   Patient presents with     Shortness of Breath     sudden onset, woke patient up     HPI  Calos Callahan is a 63 year old male with a history of aortic valve stenosis who presents for shortness of breath.  History obtained from the patient and EMS.  The patient has been feeling more short of breath with a cough for the past several days.  He went to see his primary care doctor room and was diagnosed with bronchitis and discharged with a prescription for steroids and antibiotics.  The patient went to bed in a recliner last night and then awoke slightly prior to arrival feeling extremely dyspneic.  He tried to use his inhaler but could not.  EMS was called, they arrived and placed him on BiPAP for respiratory distress.  No hypoxia documented.  They then transported him here without further intervention.  The patient says he feels much better now.  He denies fever, chills, headache, chest pain, cough, abdominal pain, pleuritic pain, nausea, vomiting, diarrhea, dysuria, rash.  No swelling or pain of the lower extremities.    Allergies:  No Known Allergies    Problem List:    Patient Active Problem List    Diagnosis Date Noted     Hip fracture requiring operative repair (H) 06/27/2018     Priority: Medium     Fracture of femoral neck, left (H) 06/26/2018     Priority: Medium     Mixed hyperlipidemia 06/26/2018     Priority: Medium     Benign essential hypertension 06/26/2018     Priority: Medium     Aortic valve stenosis 06/26/2018     Priority: Medium     Follows with Dr. Douglas at [facility]. Gets yearly echocardiograms. No indication of valve surgery at this time.  Most recent echo Jan 2018  1. Normal left ventricular chamber size. Normal left ventricular systolic function. No   regional wall motion abnormalities.  Moderate   concentric increase in left ventricular wall thickness.  Estimated left ventricular ejection   fraction is 65-70%.   2. Normal right ventricular chamber size.  Normal right ventricular systolic function.   3. Bicuspid aortic valve. Diffuse thickening and calcification of the aortic valve cusps with   reduced excursion. Moderate-to-severe   aortic stenosis. Mean transvalvular gradient is 35.1 mmHg. This is probably being   underestimated secondary to technical issues. The   aortic valve area is 1.2 cm2 and the dimensionless index is 0.3.   4. Mildly dilated ascending aorta (4.0cm).   5. When compared to the previous echocardiographic images of 02/08/2017, there has been no   significant change. The mean gradeint   is lower though this is probably related to technical reasons rather then a true clinical   chnage.       Long-term insulin use in type 2 diabetes (H) 06/26/2018     Priority: Medium     ED (erectile dysfunction) 06/26/2018     Priority: Medium     Bicuspid aortic valve 06/26/2018     Priority: Medium     Pulmonary nodules 06/26/2018     Priority: Medium     First noted 2014. Repeat CT in 2016.       Adjustment disorder with mixed anxiety and depressed mood 06/26/2018     Priority: Medium     Leukocytosis 06/26/2018     Priority: Medium     Hypokalemia 06/26/2018     Priority: Medium     Fall from ladder, initial encounter 06/26/2018     Priority: Medium     Tobacco dependence syndrome 06/26/2018     Priority: Medium        Past Medical History:    Past Medical History:   Diagnosis Date     Depressive disorder      Diabetes (H)      Hypertension      Murmur        Past Surgical History:    Past Surgical History:   Procedure Laterality Date     basal cell - face       OPEN REDUCTION INTERNAL FIXATION HIP NAILING Left 6/27/2018    Procedure: OPEN REDUCTION INTERNAL FIXATION HIP NAILING;  Perc Pinning Left Hip;  Surgeon: Donnie Tim MD;  Location: [facility] OR     OPEN REDUCTION INTERNAL FIXATION HIP NAILING Left 1/11/2019    Procedure: Hardware removal hip;  Surgeon: Donnie Tim MD;  Location: [facility] OR     SHOULDER SURGERY       VASECTOMY         Family  "History:    Family History   Problem Relation Age of Onset     Diabetes Mother      Emphysema Mother      Coronary Artery Disease Father      Kidney Disease Father      No Known Problems Daughter        Social History:  Marital Status:   [2]  Social History     Tobacco Use     Smoking status: Light Tobacco Smoker     Packs/day: 0.25     Smokeless tobacco: Never Used     Tobacco comment: cigars, quitting   Substance Use Topics     Alcohol use: Yes     Comment: 3-4 drinks on the weekends     Drug use: No        Medications:      acyclovir (ZOVIRAX) 5 % external cream   aspirin 81 MG EC tablet   Atorvastatin Calcium (LIPITOR PO)   blood glucose monitoring (CONTOUR NEXT TEST) test strip   citalopram (CELEXA) 40 MG tablet   empagliflozin-linagliptin (GLYXAMBI) 10-5 MG TABS per tablet   insulin glargine (LANTUS) 100 UNIT/ML injection   lisinopril-hydrochlorothiazide (PRINZIDE/ZESTORETIC) 20-12.5 MG per tablet   METFORMIN HCL PO   pioglitazone (ACTOS) 15 MG tablet   Sildenafil Citrate (VIAGRA PO)   valACYclovir (VALTREX) 1000 mg tablet         Review of Systems  Pertinent positives and negatives listed in the HPI, all other systems reviewed and are negative.    Physical Exam   BP: (!) 137/94  Pulse: 82  Heart Rate: 86  Resp: 17  Height: 188 cm (6' 2\")  Weight: 86.2 kg (190 lb)  SpO2: 100 %      Physical Exam   Constitutional: He is oriented to person, place, and time. He appears well-developed and well-nourished. He appears distressed.   HENT:   Head: Normocephalic and atraumatic.   Right Ear: External ear normal.   Left Ear: External ear normal.   Nose: Nose normal.   Eyes: Conjunctivae are normal. No scleral icterus.   Neck: Normal range of motion.   Cardiovascular: Normal rate and regular rhythm.   Murmur heard.   Systolic murmur is present.  Pulmonary/Chest: Effort normal. No stridor. No respiratory distress. He has no wheezes. He has no rales.   Abdominal: Soft. He exhibits no distension.   Musculoskeletal: He " exhibits no edema.   Neurological: He is alert and oriented to person, place, and time.   Skin: Skin is warm and dry. He is not diaphoretic.   Psychiatric: He has a normal mood and affect. His behavior is normal.   Nursing note and vitals reviewed.      ED Course        Procedures    Results for orders placed during the hospital encounter of 02/17/19   POC US ECHO LIMITED    [facility] Procedure Note      Limited Bedside ED Cardiac Ultrasound:    PROCEDURE: PERFORMED BY: Dr. Alvaro Martin  INDICATIONS/SYMPTOM:  Chest Pain  PROBE: Cardiac phased array probe  BODY LOCATION: Chest  FINDINGS:   The ultrasound was performed utilizing the parasternal long axis, parasternal short axis and apical 4 chamber views.  Cardiac contractility:  Present  Gross estimation of cardiac kinesis: normal  Pericardial Effusion:  None  RV:LV ratio: LV > RV  INTERPRETATION:    Chamber size and motion were grossly normal with LV > RV, normal cardiac kinesis.  No pericardial effusion was found.   IMAGE DOCUMENTATION: Images were archived to PACs system.                  EKG Interpretation:      Interpreted by Alvaro Martin  Time reviewed: 0242  Symptoms at time of EKG: Dyspnea   Rhythm: sinus   Rate: Normal  Axis: Left Axis Deviation  Ectopy: none  Conduction: left bundle branch block (complete)  ST Segments/ T Waves: No acute ischemic changes  Q Waves: v1 and III  Comparison to prior: New LBBB since 6/27/18    Clinical Impression: LBBB      Critical Care time:  none            Results for orders placed or performed during the hospital encounter of 02/17/19 (from the past 24 hour(s))   POC US ECHO LIMITED    [facility] Procedure Note      Limited Bedside ED Cardiac Ultrasound:    PROCEDURE: PERFORMED BY: Dr. Alvaro Martin  INDICATIONS/SYMPTOM:  Chest Pain  PROBE: Cardiac phased array probe  BODY LOCATION: Chest  FINDINGS:   The ultrasound was performed utilizing the parasternal long  axis, parasternal short axis and apical 4 chamber views.  Cardiac contractility:  Present  Gross estimation of cardiac kinesis: normal  Pericardial Effusion:  None  RV:LV ratio: LV > RV  INTERPRETATION:    Chamber size and motion were grossly normal with LV > RV, normal cardiac kinesis.  No pericardial effusion was found.   IMAGE DOCUMENTATION: Images were archived to PACs system.        Troponin I   Result Value Ref Range    Troponin I ES 0.588 (HH) 0.000 - 0.045 ug/L   Nt probnp inpatient   Result Value Ref Range    N-Terminal Pro BNP Inpatient 2,275 (H) 0 - 900 pg/mL   Basic metabolic panel   Result Value Ref Range    Sodium 137 133 - 144 mmol/L    Potassium 3.7 3.4 - 5.3 mmol/L    Chloride 108 94 - 109 mmol/L    Carbon Dioxide 23 20 - 32 mmol/L    Anion Gap 6 6 - 17 mmol/L    Glucose 322 (H) 70 - 99 mg/dL    Urea Nitrogen 25 7 - 30 mg/dL    Creatinine 0.67 0.66 - 1.25 mg/dL    GFR Estimate >90 >60 mL/min/[1.73_m2]    GFR Estimate If Black >90 >60 mL/min/[1.73_m2]    Calcium 7.6 (L) 8.5 - 10.1 mg/dL   CBC with platelets differential   Result Value Ref Range    WBC 11.2 (H) 4.0 - 11.0 10e9/L    RBC Count 4.74 4.4 - 5.9 10e12/L    Hemoglobin 13.2 (L) 13.3 - 17.7 g/dL    Hematocrit 40.7 40.0 - 53.0 %    MCV 86 78 - 100 fl    MCH 27.8 26.5 - 33.0 pg    MCHC 32.4 31.5 - 36.5 g/dL    RDW 14.3 10.0 - 15.0 %    Platelet Count 187 150 - 450 10e9/L    Diff Method Automated Method     % Neutrophils 83.2 %    % Lymphocytes 10.2 %    % Monocytes 4.4 %    % Eosinophils 1.5 %    % Basophils 0.3 %    % Immature Granulocytes 0.4 %    Nucleated RBCs 0 0 /100    Absolute Neutrophil 9.3 (H) 1.6 - 8.3 10e9/L    Absolute Lymphocytes 1.1 0.8 - 5.3 10e9/L    Absolute Monocytes 0.5 0.0 - 1.3 10e9/L    Absolute Eosinophils 0.2 0.0 - 0.7 10e9/L    Absolute Basophils 0.0 0.0 - 0.2 10e9/L    Abs Immature Granulocytes 0.0 0 - 0.4 10e9/L    Absolute Nucleated RBC 0.0    Chest  XR, 1 view portable    Narrative    XR CHEST PORT 1 VIEW    2/17/2019 2:57 AM     HISTORY: Dyspnea.    COMPARISON: None.    FINDINGS: Upright portable chest. The heart size is normal. The lungs  are clear. No pneumothorax.      Impression    IMPRESSION: No acute abnormality.    RITIKA PEDERSON MD       Medications   heparin infusion 25,000 units in 0.45% NaCl 250 mL (12 Units/kg/hr × 83.8 kg (Adjusted) Intravenous New Bag 2/17/19 0451)   aspirin (ASA) chewable tablet 324 mg (324 mg Oral Given 2/17/19 0449)   heparin Loading Dose bolus dose from infusion pump 5,000 Units (5,000 Units Intravenous Given 2/17/19 0451)       Assessments & Plan (with Medical Decision Making)   63-year-old male who presents for respiratory distress.  Heart rate 86, SPO2 is 100% on BiPAP at room air.  Blood pressure is 137/94.  EKG shows sinus rhythm with left bundle branch block but no signs of ischemia.  The patient is asymptomatic now and is able to be taken off of BiPAP.  Lungs are clear to auscultation throughout, unlikely pneumonia.  Chest x-ray obtained, images reviewed independently as well as radiology read reviewed, no signs of pneumothorax or heart failure.  Bedside ultrasound shows overall good function of the left ventricle, no pericardial effusion.  BNP is mildly elevated 2000, nondiagnostic.  His troponin L is elevated at 0.58.  This is concerning for non-ST elevation MI.  His EKG is stable over multiple times here without signs of ischemic changes.  He is asymptomatic over several hours.  He is given aspirin and IV heparin.  Given the patient's recurrent episodes of dyspnea on exertion over the last several months that seems to be accelerating and now this episode tonight, this fits for unstable angina leading to non-ST elevation MI.  He requires transfer to a Medical Center with cardiology availability.  He follows in the [facility] system is not is asking to be transferred to [facility].  I discussed the case with the on-call hospitalist there who agrees to accept the patient in  transfer.    I have reviewed the nursing notes.    I have reviewed the findings, diagnosis, plan and need for follow up with the patient.          Medication List      There are no discharge medications for this visit.         Final diagnoses:   NSTEMI (non-ST elevated myocardial infarction) (H)       2/17/2019   [facility] EMERGENCY DEPARTMENT     Alvaro Martin MD  02/17/19 0571

## 2019-02-17 NOTE — ED TRIAGE NOTES
Shortness of breath, PD on scene RR 55 on arrival, EMS placed on CPAP with RR 25. Patient states he feels better with the mask on.

## 2019-03-06 ENCOUNTER — HOSPITAL ENCOUNTER (OUTPATIENT)
Dept: CARDIAC REHAB | Facility: CLINIC | Age: 64
End: 2019-03-06
Attending: THORACIC SURGERY (CARDIOTHORACIC VASCULAR SURGERY)
Payer: COMMERCIAL

## 2019-03-06 PROCEDURE — 40000575 ZZH STATISTIC OP CARDIAC VISIT #2

## 2019-03-06 PROCEDURE — 93798 PHYS/QHP OP CAR RHAB W/ECG: CPT

## 2019-03-06 PROCEDURE — 40000116 ZZH STATISTIC OP CR VISIT

## 2019-03-06 PROCEDURE — 93797 PHYS/QHP OP CAR RHAB WO ECG: CPT

## 2019-03-13 ENCOUNTER — HOSPITAL ENCOUNTER (OUTPATIENT)
Dept: CARDIAC REHAB | Facility: CLINIC | Age: 64
End: 2019-03-13
Attending: THORACIC SURGERY (CARDIOTHORACIC VASCULAR SURGERY)
Payer: COMMERCIAL

## 2019-03-13 PROCEDURE — 40000116 ZZH STATISTIC OP CR VISIT

## 2019-03-13 PROCEDURE — 93798 PHYS/QHP OP CAR RHAB W/ECG: CPT

## 2019-03-15 ENCOUNTER — HOSPITAL ENCOUNTER (OUTPATIENT)
Dept: CARDIAC REHAB | Facility: CLINIC | Age: 64
End: 2019-03-15
Attending: THORACIC SURGERY (CARDIOTHORACIC VASCULAR SURGERY)
Payer: COMMERCIAL

## 2019-03-15 PROCEDURE — 93798 PHYS/QHP OP CAR RHAB W/ECG: CPT

## 2019-03-15 PROCEDURE — 40000116 ZZH STATISTIC OP CR VISIT

## 2019-03-18 ENCOUNTER — HOSPITAL ENCOUNTER (OUTPATIENT)
Dept: CARDIAC REHAB | Facility: CLINIC | Age: 64
End: 2019-03-18
Attending: THORACIC SURGERY (CARDIOTHORACIC VASCULAR SURGERY)
Payer: COMMERCIAL

## 2019-03-18 PROCEDURE — 40000116 ZZH STATISTIC OP CR VISIT

## 2019-03-18 PROCEDURE — 93797 PHYS/QHP OP CAR RHAB WO ECG: CPT

## 2019-03-18 PROCEDURE — 93798 PHYS/QHP OP CAR RHAB W/ECG: CPT

## 2019-03-20 ENCOUNTER — HOSPITAL ENCOUNTER (OUTPATIENT)
Dept: CARDIAC REHAB | Facility: CLINIC | Age: 64
End: 2019-03-20
Attending: THORACIC SURGERY (CARDIOTHORACIC VASCULAR SURGERY)
Payer: COMMERCIAL

## 2019-03-20 PROCEDURE — 93798 PHYS/QHP OP CAR RHAB W/ECG: CPT | Performed by: REHABILITATION PRACTITIONER

## 2019-03-20 PROCEDURE — 40000116 ZZH STATISTIC OP CR VISIT: Performed by: REHABILITATION PRACTITIONER

## 2019-03-22 ENCOUNTER — HOSPITAL ENCOUNTER (OUTPATIENT)
Dept: CARDIAC REHAB | Facility: CLINIC | Age: 64
End: 2019-03-22
Attending: THORACIC SURGERY (CARDIOTHORACIC VASCULAR SURGERY)
Payer: COMMERCIAL

## 2019-03-22 PROCEDURE — 40000116 ZZH STATISTIC OP CR VISIT

## 2019-03-22 PROCEDURE — 93798 PHYS/QHP OP CAR RHAB W/ECG: CPT

## 2019-03-25 ENCOUNTER — HOSPITAL ENCOUNTER (OUTPATIENT)
Dept: CARDIAC REHAB | Facility: CLINIC | Age: 64
End: 2019-03-25
Attending: THORACIC SURGERY (CARDIOTHORACIC VASCULAR SURGERY)
Payer: COMMERCIAL

## 2019-03-25 PROCEDURE — 93798 PHYS/QHP OP CAR RHAB W/ECG: CPT | Performed by: REHABILITATION PRACTITIONER

## 2019-03-25 PROCEDURE — 40000116 ZZH STATISTIC OP CR VISIT: Performed by: REHABILITATION PRACTITIONER

## 2019-03-27 ENCOUNTER — HOSPITAL ENCOUNTER (OUTPATIENT)
Dept: CARDIAC REHAB | Facility: CLINIC | Age: 64
End: 2019-03-27
Attending: THORACIC SURGERY (CARDIOTHORACIC VASCULAR SURGERY)
Payer: COMMERCIAL

## 2019-03-27 PROCEDURE — 93798 PHYS/QHP OP CAR RHAB W/ECG: CPT | Performed by: REHABILITATION PRACTITIONER

## 2019-03-27 PROCEDURE — 40000116 ZZH STATISTIC OP CR VISIT: Performed by: REHABILITATION PRACTITIONER

## 2019-04-03 ENCOUNTER — HOSPITAL ENCOUNTER (OUTPATIENT)
Dept: CARDIAC REHAB | Facility: CLINIC | Age: 64
End: 2019-04-03
Attending: THORACIC SURGERY (CARDIOTHORACIC VASCULAR SURGERY)
Payer: COMMERCIAL

## 2019-04-03 PROCEDURE — 93798 PHYS/QHP OP CAR RHAB W/ECG: CPT | Performed by: REHABILITATION PRACTITIONER

## 2019-04-03 PROCEDURE — 40000116 ZZH STATISTIC OP CR VISIT: Performed by: REHABILITATION PRACTITIONER

## 2019-04-08 ENCOUNTER — HOSPITAL ENCOUNTER (OUTPATIENT)
Dept: CARDIAC REHAB | Facility: CLINIC | Age: 64
End: 2019-04-08
Attending: THORACIC SURGERY (CARDIOTHORACIC VASCULAR SURGERY)
Payer: COMMERCIAL

## 2019-04-08 PROCEDURE — 40000116 ZZH STATISTIC OP CR VISIT

## 2019-04-08 PROCEDURE — 93798 PHYS/QHP OP CAR RHAB W/ECG: CPT

## 2019-04-10 ENCOUNTER — HOSPITAL ENCOUNTER (OUTPATIENT)
Dept: CARDIAC REHAB | Facility: CLINIC | Age: 64
End: 2019-04-10
Attending: THORACIC SURGERY (CARDIOTHORACIC VASCULAR SURGERY)
Payer: COMMERCIAL

## 2019-04-10 PROCEDURE — 40000116 ZZH STATISTIC OP CR VISIT

## 2019-04-10 PROCEDURE — 93798 PHYS/QHP OP CAR RHAB W/ECG: CPT

## 2019-04-17 ENCOUNTER — HOSPITAL ENCOUNTER (OUTPATIENT)
Dept: CARDIAC REHAB | Facility: CLINIC | Age: 64
End: 2019-04-17
Attending: THORACIC SURGERY (CARDIOTHORACIC VASCULAR SURGERY)
Payer: COMMERCIAL

## 2019-04-17 PROCEDURE — 40000116 ZZH STATISTIC OP CR VISIT

## 2019-04-17 PROCEDURE — 93798 PHYS/QHP OP CAR RHAB W/ECG: CPT

## 2019-04-22 ENCOUNTER — HOSPITAL ENCOUNTER (OUTPATIENT)
Dept: CARDIAC REHAB | Facility: CLINIC | Age: 64
End: 2019-04-22
Attending: THORACIC SURGERY (CARDIOTHORACIC VASCULAR SURGERY)
Payer: COMMERCIAL

## 2019-04-22 PROCEDURE — 93798 PHYS/QHP OP CAR RHAB W/ECG: CPT

## 2019-04-22 PROCEDURE — 40000116 ZZH STATISTIC OP CR VISIT

## 2019-04-24 ENCOUNTER — HOSPITAL ENCOUNTER (OUTPATIENT)
Dept: CARDIAC REHAB | Facility: CLINIC | Age: 64
End: 2019-04-24
Attending: THORACIC SURGERY (CARDIOTHORACIC VASCULAR SURGERY)
Payer: COMMERCIAL

## 2019-04-24 PROCEDURE — 40000116 ZZH STATISTIC OP CR VISIT

## 2019-04-24 PROCEDURE — 93798 PHYS/QHP OP CAR RHAB W/ECG: CPT

## 2019-04-29 ENCOUNTER — HOSPITAL ENCOUNTER (OUTPATIENT)
Dept: CARDIAC REHAB | Facility: CLINIC | Age: 64
End: 2019-04-29
Attending: THORACIC SURGERY (CARDIOTHORACIC VASCULAR SURGERY)
Payer: COMMERCIAL

## 2019-04-29 PROCEDURE — 40000116 ZZH STATISTIC OP CR VISIT

## 2019-04-29 PROCEDURE — 93798 PHYS/QHP OP CAR RHAB W/ECG: CPT

## 2019-05-08 ENCOUNTER — HOSPITAL ENCOUNTER (OUTPATIENT)
Dept: CARDIAC REHAB | Facility: CLINIC | Age: 64
End: 2019-05-08
Attending: THORACIC SURGERY (CARDIOTHORACIC VASCULAR SURGERY)
Payer: COMMERCIAL

## 2019-05-08 PROCEDURE — 40000116 ZZH STATISTIC OP CR VISIT

## 2019-05-08 PROCEDURE — 93798 PHYS/QHP OP CAR RHAB W/ECG: CPT

## 2019-05-13 ENCOUNTER — HOSPITAL ENCOUNTER (OUTPATIENT)
Dept: CARDIAC REHAB | Facility: CLINIC | Age: 64
End: 2019-05-13
Attending: THORACIC SURGERY (CARDIOTHORACIC VASCULAR SURGERY)
Payer: COMMERCIAL

## 2019-05-13 PROCEDURE — 40000116 ZZH STATISTIC OP CR VISIT

## 2019-05-13 PROCEDURE — 93798 PHYS/QHP OP CAR RHAB W/ECG: CPT

## 2019-05-29 ENCOUNTER — HOSPITAL ENCOUNTER (OUTPATIENT)
Dept: CARDIAC REHAB | Facility: CLINIC | Age: 64
End: 2019-05-29
Attending: THORACIC SURGERY (CARDIOTHORACIC VASCULAR SURGERY)
Payer: COMMERCIAL

## 2019-05-29 PROCEDURE — 40000116 ZZH STATISTIC OP CR VISIT: Performed by: REHABILITATION PRACTITIONER

## 2019-05-29 PROCEDURE — 93798 PHYS/QHP OP CAR RHAB W/ECG: CPT | Performed by: REHABILITATION PRACTITIONER

## 2019-06-03 ENCOUNTER — HOSPITAL ENCOUNTER (OUTPATIENT)
Dept: CARDIAC REHAB | Facility: CLINIC | Age: 64
End: 2019-06-03
Attending: THORACIC SURGERY (CARDIOTHORACIC VASCULAR SURGERY)
Payer: COMMERCIAL

## 2019-06-03 PROCEDURE — 40000116 ZZH STATISTIC OP CR VISIT

## 2019-06-03 PROCEDURE — 93798 PHYS/QHP OP CAR RHAB W/ECG: CPT

## 2019-06-12 ENCOUNTER — HOSPITAL ENCOUNTER (OUTPATIENT)
Dept: CARDIAC REHAB | Facility: CLINIC | Age: 64
End: 2019-06-12
Attending: THORACIC SURGERY (CARDIOTHORACIC VASCULAR SURGERY)
Payer: COMMERCIAL

## 2019-06-12 PROCEDURE — 93798 PHYS/QHP OP CAR RHAB W/ECG: CPT

## 2019-06-12 PROCEDURE — 40000116 ZZH STATISTIC OP CR VISIT

## 2019-07-24 ENCOUNTER — HOSPITAL ENCOUNTER (OUTPATIENT)
Dept: CARDIAC REHAB | Facility: CLINIC | Age: 64
End: 2019-07-24
Attending: THORACIC SURGERY (CARDIOTHORACIC VASCULAR SURGERY)
Payer: COMMERCIAL

## 2019-07-24 PROCEDURE — 40000116 ZZH STATISTIC OP CR VISIT

## 2019-07-24 PROCEDURE — 93798 PHYS/QHP OP CAR RHAB W/ECG: CPT

## 2025-07-07 NOTE — IP AVS SNAPSHOT
MRN:[id number]                      After Visit Summary   6/26/2018    Calos Callahan    MRN: [id number]           Thank you!     Thank you for choosing [facility] for your care. Our goal is always to provide you with excellent care. Hearing back from our patients is one way we can continue to improve our services. Please take a few minutes to complete the written survey that you may receive in the mail after you visit with us. Thank you!        Patient Information     Date Of Birth          1955        Designated Caregiver       Most Recent Value    Caregiver    Will someone help with your care after discharge? yes    Name of designated caregiver Nicky Callahan    Phone number of caregiver [phone]    Caregiver address same      About your hospital stay     You were admitted on:  June 26, 2018 You last received care in the:  [facility]    You were discharged on:  June 29, 2018        Reason for your hospital stay       Left femoral neck fracture of the hip                  Who to Call     For medical emergencies, please call 911.  For non-urgent questions about your medical care, please call your primary care provider or clinic, [phone]  For questions related to your surgery, please call your surgery clinic        Attending Provider     Provider Specialty    Alvaro Martin MD Emergency Medicine    Ban Babb MD Internal Medicine    Donnie Tim MD Orthopaedic Surgery    Leopold, Melvin Abdalla MD Pulmonary       Primary Care Provider Office Phone # Fax #    Steven Duane Semmler, [phone]       When to contact your care team       Call your Orthopedic surgeon at [facility]  if you have any of the following: temperature greater than 100.4,  increased shortness of breath, increased drainage, increased swelling or increased pain.                  After Care Instructions     Activity       Your activity upon discharge:  Attempted to notify pt of procedure date and time. No answer. Vm left.      Activity as tolerated, no driving until off narcotic pain medication. You may return to work when cleared by your orthopedic surgeon or physician assistant.  Non weight bearing on the left hip for 6-8 weeks.            Diet       Follow this diet upon discharge: Orders Placed This Encounter      High Consistent CHO Diet              Diet       Follow this diet upon discharge: Orders Placed This Encounter      Consistent Carbohydrate Diet            Wound care and dressings       Instructions to care for your wound at home: as directed, daily dressing changes, ice to area for comfort, keep wound clean and dry and may get incision wet in shower but do not soak or scrub.                  Follow-up Appointments     Follow-up and recommended labs and tests       Follow up with  Declan Eaton PA-C at  [facility], within 2 weeks to evaluate after surgery and for hospital follow- up.            Follow-up and recommended labs and tests        Follow up with primary care provider, Steven Duane Semmler, within 7 days for hospital follow- up.  No follow up labs or test are needed.                  Further instructions from your care team       Orthopedic Surgery Discharge Instructions    1. Follow up:  Follow up with Declan Eaton PA-C.  in 2 weeks for post op check and x rays as scheduled.  Call [phone] if appointment needed or questions. If you have questions or concerns while at home, please call [facility]. If it is an emergency, call [phone]. You may find the answers to questions in the included discharge packet from the hospital or your pre operative packet you received in clinic prior to surgery.    2. Pain Medication:  Use pain medication as directed. If you are prescribed narcotic pain medications (Oxycodone, Norco, Percocet, Tylenol #3, Dilaudid, or Tramadol) then you should try to wean off of them as tolerated. These are an AS NEEDED medication, so if you are not having significant pain  you should try to take fewer pills at a time or spread out the doses out over a longer period of time than is written on the prescription. You should not drive a car or operate machinery if you are taking prescribed narcotic pain medication. You may not receive a refill on this medication by your surgical team until your follow up appointment, so try to wean off your narcotics as soon as possible. If you need a refill on this medication you may call your surgical team to discuss during business hours. Refills are not given on the weekend under any circumstances, so plan accordingly.    3. How to wean narcotics: Within 2-3 days of surgery you should be able to begin weaning your pain medications. If upon leaving the hospital you are taking 2 tabs every 3-4 hours, you should decrease this to 1 tab every 3-4 hours. Around 4-5 days after surgery you should begin decreasing the frequency of your pain medication to every 4-5 hours. You may also cut your pills in half to decrease the dose as you begin the weaning process. Create a weaning schedule of your pain medication when you get home from the hospital, you may be expected to make the prescription last until your next appointment. Call your surgical team during business hours to discuss your pain medication if you have questions or concerns about the weaning process.    4. Tylenol and pain medications: If your pain pill contains Tylenol (i.e. Percocet, Norco, Tylenol #3) and you are also taking additional Tylenol/acetaminophen as a pain medication, ensure that you are not taking too much tylenol. Prescription narcotic medications that contain Tylenol usually have 325mg of Tylenol per pill and over-the-counter medications have variable doses of Tylenol. You should not exceed 4,000mg of Tylenol in a 24-hour period. Tylenol should be used in combination with your pain medication, if able, to help reduce the amount of pain medication you are taking.    5. NSAIDs (anti  inflammatory medications): You may take NSAIDs to help control your pain at home.  NSAIDs are Ibuprofen, Motrin, Advil, Aleve, Naprosyn etc. You should use over the counter medications such as NSAIDs and Tylenol to wean off narcotics. If you are also taking Aspirin for blood clot prevention, you should use caution with NSAIDs as this may cause issues such as GI bleeding, upset stomach, and dark stools. Recommended doses of NSAIDs after surgery are 1-2 tabs every 6-8 hours, not to exceed 600 mg per dose. It is best to rotate Tylenol and NSAIDs if needed every 4 hours. Use these medications in between your narcotics to help reduce the amount of pain medication needed.      6. How to take over the counter medications with pain medications:  Sample medication schedule:   8 am: Pain medication  10 am: Tylenol 500-650 mg (skip if your pain medication contains tylenol, refer to #4)  12 pm: Pain medication  2 pm: NSAIDs 1-2 tabs, not to exceeded 600 mg  4 pm: Pain medication  6 pm: Tylenol 500-650 mg (skip if your pain medication contains tylenol, refer to #4)  8 pm: Pain medication    7. Applying ice to your incision: ice can provide additional pain relief at home. Apply  ice in 20 minute intervals. Allow your skin to warm up to room temperature, approximately 40 minutes, before applying another ice pack.    8. Incision instructions:  Keep your incision clean, covered and dry until your post op appointment.  You may shower and get the incision wet if no drainage is present.  You may change your dressing as needed.      9. Blood Clot Prevention: Take Aspirin 325 mg  daily  for 42 days for anticoagulation. If you develop abdominal pain or have signs of bleeding - blood in stool or black stools, stop taking the medication and seek medical care. Walk often at home, walking will help reduce the risk of blood clots. If you have been prescribed l[place] stockings or compression stockings, wear them during the day until you follow up  "with your orthopedic team in clinic. If you were not given Roland Stockings in the hospital but would like them, they can be purchased over the counter at your local pharmacy.     10. Call the office if you have any questions/concerns or are experiencing the following:  - increasing drainage from the incision  - foul-smelling or malodorous drainage from the incision  - sudden increase or change in pain that is not controlled by your prescribed medications  - inability to urinate  - new onset of weakness, numbness or severe pain in the extremities  - bowel/bladder incontinence  - Fever greater than 101.5 degrees  - Nausea/vomitting causing inability to eat food or medications        Pending Results     Date and Time Order Name Status Description    6/27/2018 0134 EKG 12-LEAD, TRACING ONLY In process             Statement of Approval     Ordered          06/29/18 1324  I have reviewed and agree with all the recommendations and orders detailed in this document.  EFFECTIVE NOW     Approved and electronically signed by:  Melvin Rajput MD             Admission Information     Date & Time Provider Department Dept. Phone    6/26/2018 Melvin Rajput MD [facility] [phone]      Your Vitals Were     Blood Pressure Pulse Temperature Respirations Height Weight    140/83 57 97.9  F (36.6  C) (Oral) 18 1.854 m (6' 1\") 94.3 kg (208 lb)    Pulse Oximetry BMI (Body Mass Index)                96% 27.44 kg/m2          [employer] Information     [employer] lets you send messages to your doctor, view your test results, renew your prescriptions, schedule appointments and more. To sign up, go to [email or web address] . Click on \"Log in\" on the left side of the screen, which will take you to the Welcome page. Then click on \"Sign up Now\" on the right side of the page.     You will be asked to enter the access code listed below, as well as some personal information. Please follow the directions to create your " username and password.     Your access code is: [email or web address]  Expires: 2018  4:31 PM     Your access code will  in 90 days. If you need help or a new code, please call your [place] clinic or [phone].        Care EveryWhere ID     This is your Care EveryWhere ID. This could be used by other organizations to access your [place] medical records  [id number]        Equal Access to Services     KATIE [facility] CenterVISHAL : Hadii aad ku hadasho Soomaali, waaxda luqadaha, qaybta kaalmada adeegyada, waxay idiin hayaan adeoswaldo barreramaricruznaga nicholson . So [facility] [phone].    ATENCIÓN: Si diandrala keegan, tiene a lindsay disposición servicios gratuitos de asistencia lingüística. Llame al [phone].    We comply with applicable federal civil rights laws and [place] laws. We do not discriminate on the basis of race, color, national origin, age, disability, sex, sexual orientation, or gender identity.               Review of your medicines      START taking        Dose / Directions    acetaminophen 325 MG tablet   Commonly known as:  TYLENOL        Dose:  650 mg   Take 2 tablets (650 mg) by mouth every 4 hours as needed for mild pain   Quantity:  100 tablet   Refills:  0       baclofen 10 MG tablet   Commonly known as:  LIORESAL        Dose:  10 mg   Take 1 tablet (10 mg) by mouth 3 times daily   Quantity:  20 tablet   Refills:  1       order for DME        Equipment being ordered: Walker Wheels () and Walker () Treatment Diagnosis: s/p L hip fx   Quantity:  1 Units   Refills:  0       order for DME        Equipment being ordered: shower chair with handles, toilet riser   Quantity:  1 Units   Refills:  0       oxyCODONE IR 5 MG tablet   Commonly known as:  ROXICODONE        Dose:  5-10 mg   Take 1-2 tablets (5-10 mg) by mouth every 3 hours as needed for moderate to severe pain or other   Quantity:  30 tablet   Refills:  0       senna-docusate 8.6-50 MG per tablet   Commonly known as:  SENOKOT-S;PERICOLACE        Dose:  2  tablet   Take 2 tablets by mouth 2 times daily   Quantity:  100 tablet   Refills:  0         CONTINUE these medicines which may have CHANGED, or have new prescriptions. If we are uncertain of the size of tablets/capsules you have at home, strength may be listed as something that might have changed.        Dose / Directions    aspirin 325 MG EC tablet   This may have changed:    - medication strength  - how much to take  - additional instructions        Dose:  325 mg   Take 1 tablet (325 mg) by mouth daily Resume 81 mg Aspirin upon completion   Quantity:  42 tablet   Refills:  0         CONTINUE these medicines which have NOT CHANGED        Dose / Directions    ACTOS PO        Dose:  15 mg   Take 15 mg by mouth daily   Refills:  0       acyclovir 5 % cream   Commonly known as:  ZOVIRAX        Apply topically 5 times daily As needed for cold sores   Refills:  0       citalopram 40 MG tablet   Commonly known as:  celeXA        Dose:  40 mg   Take 40 mg by mouth daily   Refills:  0       CONTOUR NEXT TEST test strip   Generic drug:  blood glucose monitoring        by In Vitro route 2 times daily   Refills:  0       empagliflozin-linagliptin 10-5 MG Tabs per tablet   Commonly known as:  GLYXAMBI        Dose:  1 tablet   Take 1 tablet by mouth every morning   Refills:  0       IBUPROFEN PO        Dose:  800 mg   Take 800 mg by mouth as needed for moderate pain   Refills:  0       insulin glargine 100 UNIT/ML injection   Commonly known as:  LANTUS        Dose:  42 Units   Inject 42 Units Subcutaneous every morning   Refills:  0       LIPITOR PO        Dose:  40 mg   Take 40 mg by mouth daily   Refills:  0       lisinopril-hydrochlorothiazide 20-12.5 MG per tablet   Commonly known as:  PRINZIDE/ZESTORETIC        Dose:  2 tablet   Take 2 tablets by mouth daily   Refills:  0       METFORMIN HCL PO        Dose:  2000 mg   Take 2,000 mg by mouth daily (with breakfast)   Refills:  0       valACYclovir 1000 mg tablet   Commonly  known as:  VALTREX        Dose:  2 tablet   Take 2 tablets by mouth 2 times daily as needed For cold sores   Refills:  0       * varenicline 0.5 MG X 11 & 1 MG X 42 tablet   Commonly known as:  CHANTIX MEDINA        Days 1-3 take 0.5mg once daily; Days 4-7 take 0.5mg twice daily; then increase to 1mg twice daily. Take with meals.   Refills:  0       * varenicline 1 MG tablet   Commonly known as:  CHANTIX        Dose:  1 mg   Take 1 mg by mouth 2 times daily (with meals)   Refills:  0       VIAGRA PO   Indication:  Erectile Dysfunction        Dose:  100 mg   Take 100 mg by mouth   Refills:  0       * Notice:  This list has 2 medication(s) that are the same as other medications prescribed for you. Read the directions carefully, and ask your doctor or other care provider to review them with you.         Where to get your medicines      These medications were sent to [facility] [address]  [address] [address]     Phone:  [phone]     acetaminophen 325 MG tablet    aspirin 325 MG EC tablet    baclofen 10 MG tablet         Some of these will need a paper prescription and others can be bought over the counter. Ask your nurse if you have questions.     Bring a paper prescription for each of these medications     order for DME    order for DME    oxyCODONE IR 5 MG tablet       You don't need a prescription for these medications     senna-docusate 8.6-50 MG per tablet                Protect others around you: Learn how to safely use, store and throw away your medicines at www.disposemymeds.org.        ANTIBIOTIC INSTRUCTION     You've Been Prescribed an Antibiotic - Now What?  Your healthcare team thinks that you or your loved one might have an infection. Some infections can be treated with antibiotics, which are powerful, life-saving drugs. Like all medications, antibiotics have side effects and should only be used when necessary. There are some important things you  should know about your antibiotic treatment.      Your healthcare team may run tests before you start taking an antibiotic.    Your team may take samples (e.g., from your blood, urine or other areas) to run tests to look for bacteria. These test can be important to determine if you need an antibiotic at all and, if you do, which antibiotic will work best.      Within a few days, your healthcare team might change or even stop your antibiotic.    Your team may start you on an antibiotic while they are working to find out what is making you sick.    Your team might change your antibiotic because test results show that a different antibiotic would be better to treat your infection.    In some cases, once your team has more information, they learn that you do not need an antibiotic at all. They may find out that you don't have an infection, or that the antibiotic you're taking won't work against your infection. For example, an infection caused by a virus can't be treated with antibiotics. Staying on an antibiotic when you don't need it is more likely to be harmful than helpful.      You may experience side effects from your antibiotic.    Like all medications, antibiotics have side effects. Some of these can be serious.    Let you healthcare team know if you have any known allergies when you are admitted to the hospital.    One significant side effect of nearly all antibiotics is the risk of severe and sometimes deadly diarrhea caused by Clostridium difficile (C. Difficile). This occurs when a person takes antibiotics because some good germs are destroyed. Antibiotic use allows C. diificile to take over, putting patients at high risk for this serious infection.    As a patient or caregiver, it is important to understand your or your loved one's antibiotic treatment. It is especially important for caregivers to speak up when patients can't speak for themselves. Here are some important questions to ask your healthcare  team.    What infection is this antibiotic treating and how do you know I have that infection?    What side effects might occur from this antibiotic?    How long will I need to take this antibiotic?    Is it safe to take this antibiotic with other medications or supplements (e.g., vitamins) that I am taking?     Are there any special directions I need to know about taking this antibiotic? For example, should I take it with food?    How will I be monitored to know whether my infection is responding to the antibiotic?    What tests may help to make sure the right antibiotic is prescribed for me?      Information provided by:  www.cdc.gov/getsmart  U.S. Department of Health and Human Services  Centers for disease Control and Prevention  National Center for Emerging and Zoonotic Infectious Diseases  Division of Healthcare Quality Promotion        Information about OPIOIDS     PRESCRIPTION OPIOIDS: WHAT YOU NEED TO KNOW   We gave you an opioid (narcotic) pain medicine. It is important to manage your pain, but opioids are not always the best choice. You should first try all the other options your care team gave you. Take this medicine for as short a time (and as few doses) as possible.     These medicines have risks:    DO NOT drive when on new or higher doses of pain medicine. These medicines can affect your alertness and reaction times, and you could be arrested for driving under the influence (DUI). If you need to use opioids long-term, talk to your care team about driving.    DO NOT operate heave machinery    DO NOT do any other dangerous activities while taking these medicines.     DO NOT drink any alcohol while taking these medicines.      If the opioid prescribed includes acetaminophen, DO NOT take with any other medicines that contain acetaminophen. Read all labels carefully. Look for the word  acetaminophen  or  Tylenol.  Ask your pharmacist if you have questions or are unsure.    You can get addicted to pain  medicines, especially if you have a history of addiction (chemical, alcohol or substance dependence). Talk to your care team about ways to reduce this risk.    Store your pills in a secure place, locked if possible. We will not replace any lost or stolen medicine. If you don t finish your medicine, please throw away (dispose) as directed by your pharmacist. The [place] Pollution Control Agency has more information about safe disposal: https://www.pca.[facility].[place].us/living-green/managing-unwanted-medications.     All opioids tend to cause constipation. Drink plenty of water and eat foods that have a lot of fiber, such as fruits, vegetables, prune juice, apple juice and high-fiber cereal. Take a laxative (Miralax, milk of magnesia, Colace, Senna) if you don t move your bowels at least every other day.              Medication List: This is a list of all your medications and when to take them. Check marks below indicate your daily home schedule. Keep this list as a reference.      Medications           Morning Afternoon Evening Bedtime As Needed    acetaminophen 325 MG tablet   Commonly known as:  TYLENOL   Take 2 tablets (650 mg) by mouth every 4 hours as needed for mild pain   Last time this was given:  975 mg on 6/29/2018  6:08 AM                                   ACTOS PO   Take 15 mg by mouth daily   Last time this was given:  15 mg on 6/29/2018  8:48 AM   Next Dose Due:  6/30                                   acyclovir 5 % cream   Commonly known as:  ZOVIRAX   Apply topically 5 times daily As needed for cold sores                                   aspirin 325 MG EC tablet   Take 1 tablet (325 mg) by mouth daily Resume 81 mg Aspirin upon completion   Next Dose Due:  6/30/18                                    baclofen 10 MG tablet   Commonly known as:  LIORESAL   Take 1 tablet (10 mg) by mouth 3 times daily   Last time this was given:  10 mg on 6/29/2018  8:46 AM   Next Dose Due:  6/29                                       citalopram 40 MG tablet   Commonly known as:  celeXA   Take 40 mg by mouth daily   Last time this was given:  40 mg on 6/29/2018  8:46 AM   Next Dose Due:  6/30                                   CONTOUR NEXT TEST test strip   by In Vitro route 2 times daily   Generic drug:  blood glucose monitoring                                empagliflozin-linagliptin 10-5 MG Tabs per tablet   Commonly known as:  GLYXAMBI   Take 1 tablet by mouth every morning   Next Dose Due:  6/30                                   IBUPROFEN PO   Take 800 mg by mouth as needed for moderate pain                                   insulin glargine 100 UNIT/ML injection   Commonly known as:  LANTUS   Inject 42 Units Subcutaneous every morning   Last time this was given:  21 Units on 6/29/2018  8:49 AM   Next Dose Due:  6/30                                   LIPITOR PO   Take 40 mg by mouth daily   Last time this was given:  40 mg on 6/29/2018  8:46 AM   Next Dose Due:  6/30                                   lisinopril-hydrochlorothiazide 20-12.5 MG per tablet   Commonly known as:  PRINZIDE/ZESTORETIC   Take 2 tablets by mouth daily   Next Dose Due:  6/30                                   METFORMIN HCL PO   Take 2,000 mg by mouth daily (with breakfast)   Last time this was given:  2,000 mg on 6/29/2018  8:46 AM   Next Dose Due:  6/30                                   order for DME   Equipment being ordered: Walker Wheels () and Walker () Treatment Diagnosis: s/p L hip fx                                order for DME   Equipment being ordered: shower chair with handles, toilet riser                                oxyCODONE IR 5 MG tablet   Commonly known as:  ROXICODONE   Take 1-2 tablets (5-10 mg) by mouth every 3 hours as needed for moderate to severe pain or other   Last time this was given:  10 mg on 6/29/2018 12:09 PM                                   senna-docusate 8.6-50 MG per tablet   Commonly known as:  SENOKOT-S;PERICOLACE    Take 2 tablets by mouth 2 times daily   Last time this was given:  2 tablets on 6/29/2018  8:46 AM   Next Dose Due:  6/29                                   valACYclovir 1000 mg tablet   Commonly known as:  VALTREX   Take 2 tablets by mouth 2 times daily as needed For cold sores                                   * varenicline 0.5 MG X 11 & 1 MG X 42 tablet   Commonly known as:  CHANTIX MEDINA   Days 1-3 take 0.5mg once daily; Days 4-7 take 0.5mg twice daily; then increase to 1mg twice daily. Take with meals.                                * varenicline 1 MG tablet   Commonly known as:  CHANTIX   Take 1 mg by mouth 2 times daily (with meals)                                VIAGRA PO   Take 100 mg by mouth                                * Notice:  This list has 2 medication(s) that are the same as other medications prescribed for you. Read the directions carefully, and ask your doctor or other care provider to review them with you.              More Information        Discharge Instructions for Hip Fracture Surgery  You had surgery to repair a hip fracture. The type of surgery you received depends on the location and severity of the fracture. You may have pins, screws, or rods (internal fixation devices) holding the fractured bone in place. Or some or all of your hip may have been replaced. You must take care of your new hip as you recover at home or in a rehabilitation facility. This means moving and sitting the way you were taught in the hospital. You must also see your doctor for follow-up visits as you slowly return to activity.  Hip repair for fracture or hip replacement is major surgery. So don t be surprised if it takes a few months before you can move comfortably. Plan to have your family and friends help when you return home.  Home care    Take your pain medicine exactly as directed.    Don t drive until your doctor says it s OK. And never drive if you are taking opioid pain medicine.    Wear the support  stockings you were given in the hospital. Wear them 24 hours a day for 3 to 4 week(s).    Make arrangements to have your staples removed 2 weeks after surgery. The staples were used to close the skin incision.    Get up and carefully move around to relieve pain.    If you got an artificial hip joint, tell all your healthcare providers--including your dentist--about the joint before any procedure. You may need to take antibiotics before dental work and other medical procedures to reduce the risk for infection.  Incision care    Prevent infection by washing your hands often. If an infection occurs, it will need to be treated with antibiotics right away. Call your doctor right away if you think you may have an infection. Symptoms of infection include a fever or leakage of white, greenish, or yellowish-colored fluid from the incision.    Check your incision daily for redness, tenderness, or drainage.    Don't soak your wound in water until your doctor says it s OK. This means no hot tubs, bathtubs, or swimming pools.    Wait 7 day(s) after your surgery to begin showering. Then shower as needed. Carefully wash your incision with soap and water. Gently pat it dry. Don t rub the incision, or apply creams or lotions. And sit on a shower stool when you shower to keep from falling.  Sitting and sleeping    Don t sit for more than 30 to 45 minutes at a time.    Use chairs with arms, and sit with your knees slightly lower than your hips. Don t sit on low or sagging chairs or couches.    Don t lean forward while sitting.    Don t cross your legs.    Keep your feet flat on the floor. Don t turn your foot or leg inward. This stresses your hip joint.    Use an elevated toilet seat for 6 week(s) after surgery.    Use pillows between your legs when sleeping on your back or on your healthy side.    Sit on a firm cushion when you ride in a car and avoid sitting too low. Try not to bend your hip too much when getting in and out of the  car.  Moving safely    Don t bend at the hip when you bend over. Don t bend at the waist to put on socks and shoes. And avoid picking up items from the floor.    Use a cane, crutches, a walker, or handrails until your balance, flexibility, and strength improve. And remember to ask for help from others when you need it.    Free up your hands so that you can use them to keep balance. Use a kim pack, apron, or pockets to carry things.    Follow your doctor s orders regarding how much weight to place on the affected leg.    Do all exercises as instructed.    Arrange your household to keep the items you need within reach.    Remove electrical cords, throw rugs, and anything else that may cause you to fall.    Use nonslip bath mats, grab bars, an elevated toilet seat, and a shower chair in your bathroom.  Follow-up  Make a follow-up appointment as directed by your doctor.     Call 911  Call 911 right away if you have any of the following:    Chest pain    Shortness of breath  When to call your healthcare provider  Call your healthcare provider right away if you have any of the following:    Hip pain gets worse    Pain or swelling of your calf or leg not related to your incision    Tenderness or redness in your calf    Fever of 100.4 F  (38 C) or higher, or as directed by your healthcare provider    Shaking chills    Swelling or redness at the incision site gets worse    Fluid draining from the incision   Date Last Reviewed: 11/15/2015    5318-9370 [employer]. [address] [place] [address]. All rights reserved. This information is not intended as a substitute for professional medical care. Always follow your healthcare professional's instructions.                Understanding the Risks and Side Effects of Opioid Medicines  When opioids are taken as prescribed, they are usually safe and can help manage pain effectively. But they do come with risks and side effects that are important to  understand. Of the risks that can occur with opioid treatment, opioid overdose is the most serious. Overdose means taking a too high dose. For this reason, it is critical that you and your loved ones understand the signs and symptoms of an opioid overdose and what to do if it occurs.   Risks of opioid medicines  If you take opioids regularly for a long time, there is a risk of forming a tolerance or dependence to the medicines. There is also the risk of forming an addiction. But this is much less common when opioids are taken as directed under the care of a healthcare provider. Understanding the differences between tolerance, dependence, and addiction is important. This helps you know what to expect when taking opioids and know what to do if you think you may be addicted.     Tolerance means that your body needs higher doses than before to get the same pain relief effects. Most people who take opioids for longer than a few weeks will form a tolerance. This is normal. Your healthcare provider will work with you to manage tolerance and ensure that your pain is still controlled.    Dependence means your body will have withdrawal symptoms if you reduce or stop taking the medicine. These symptoms can include sleeplessness, rapid heartbeat, rapid breathing, and diarrhea. Forming a dependence is common for people taking opioids regularly for a long time. When it is time to stop taking the medicine, your healthcare provider will work closely with you to taper the medicine to lessen withdrawal symptoms. You should never stop taking or reduce the amount of medicine you are used to taking without talking to your healthcare provider. Note: Dependence is not the same thing as addiction.    Addiction occurs when a person has the urge to seek out the medicine and can't stop using it despite the harm and negative effects it might cause. Some people, such as those who have a history of drug misuse, are at higher risk for addiction.  Your healthcare provider will follow up with you regularly and also monitor you for signs of addiction. If you think you are forming an addiction to your medicine, call your healthcare provider right away.     What is opioid-use disorder?  Opioid-use disorder is a risk of taking opioid medicines. It may be diagnosed if a person shows a pattern of taking opioids despite negative consequences such as:    The opioid interferes with life, family or work obligations (this includes avoiding situations because of opioid use)    The opioid causes physical or psychological problems    Continued and increased amount of time spent attempting to obtain, use and recover from opioid use    Unsuccessful attempts to cut down or stop opioid use    Using a higher amount of opioid than prescribed or using it in unsafe situations (such as driving)    Unmanaged signs and symptoms of tolerance or withdrawal  If you or your family suspect opioid-use disorder, contact your healthcare provider right away. They can help you assess the problem and provide treatment if needed.    Risk for overdose  Opioids affect the part of the brain that controls breathing. An overdose of opioids can slow breathing down too much and even stop a person s breathing. This can be fatal. Call 911 right away if an overdose is suspected in any person.   Three key signs and symptoms of opioid overdose are:    Narrowing of dark circles in the middle of eyes (pinpoint pupils)    Slowed or stopped breathing    Unconsciousness (this is when a person passes out and does not respond)  Other signs and symptoms to look for include:    Limp body    Pale face    Clammy skin    Purple or blue color of the lips and fingernails    Vomiting   Your healthcare provider may prescribe a medicine called naloxone in case of opioid overdose. When given within a certain period of time after an overdose, naloxone can help reverse the life-threatening effects of the opioid. Emergency care  will still be needed.  Side effects of opioid medicines  Some side effects are common when taking opioids. These include constipation, nausea, sleepiness, impaired motor skills, and problems emptying the bladder (urinary retention). Opioid medicines can also cause problems with memory, thinking, and judgment, especially in older adults.   If you have any of these side effects, talk with your healthcare provider or pharmacist. They can provide advice for managing them. This might include:    Reducing the dose of your opioid medicine (never do this without talking with your healthcare provider)    Trying a different type or brand of opioid medicine    Adding a drug to treat the side effect   In some cases, your healthcare provider may take measures to help prevent side effects that are likely to occur. For instance, to help prevent constipation, your healthcare provider may prescribe a laxative or stool softener at the same time you start opioid treatment.   More serious or longer-lasting side effects can occur when you don t take opioids exactly as directed. Misusing opioids can lead to liver and brain damage. To avoid these side effects:    Never take more opioids than prescribed by your healthcare provider.    Never combine opioids with non-prescribed medicines.    Never use street drugs or drink alcohol while taking opioids.    Don't take opioids in combination with benzodiazepines. Serious risks are associated with combining opioids with benzodiazepines. These risks include extreme sleepiness, slowed breathing, and death. Let your healthcare provider know if you are taking benzodiazepines.  When to call your healthcare provider  You will be carefully monitored during treatment with opioid medicines. But you should call your healthcare provider right away if you have any of these symptoms:    New pain, pain that gets worse, or pain that doesn t get better even after you take your medicine    Side effects, such as  constipation or nausea, that keep you from daily activities    Extreme sleepiness    Breathing problems  Date Last Reviewed: 8/1/2017 2000-2017 [employer]. [address] [place] [address], [place] [address]. All rights reserved. This information is not intended as a substitute for professional medical care. Always follow your healthcare professional's instructions.

## (undated) DEVICE — SU STRATAFIX MONOCRYL 3-0 SPIRAL PS-2 30CM SXMP1B106

## (undated) DEVICE — GLOVE PROTEXIS W/NEU-THERA 7.5  2D73TE75

## (undated) DEVICE — GOWN LG DISP 9515

## (undated) DEVICE — GLOVE PROTEXIS BLUE W/NEU-THERA 8.5  2D73EB85

## (undated) DEVICE — PREP DURAPREP 26ML APL 8630

## (undated) DEVICE — SU VICRYL 2-0 CT-1 36" UND J945H

## (undated) DEVICE — SPONGE LAP 18X18" 1515

## (undated) DEVICE — NDL COUNTER 20CT 31142493

## (undated) DEVICE — TAPE MICROFOAM 3" 1528-3

## (undated) DEVICE — PAD FLOOR SURGISAFE

## (undated) DEVICE — GLOVE PROTEXIS W/NEU-THERA 8.0  2D73TE80

## (undated) DEVICE — PACK BASIC 9103

## (undated) DEVICE — CAST PADDING 4" WEBRIL STERILE

## (undated) DEVICE — LABEL MEDICATION SYSTEM  3304

## (undated) DEVICE — GOWN XLG DISP 9545

## (undated) DEVICE — DRSG STERI STRIP 1X5" R1548

## (undated) DEVICE — DRAPE IOBAN ISOLATION VERTICAL 320X21CM 6617

## (undated) DEVICE — TAPE CLOTH ADHESIVE 3" ZONAS

## (undated) DEVICE — SYR BULB IRRIG DOVER 60 ML LATEX FREE 67000

## (undated) DEVICE — LIGHT HANDLE X2

## (undated) DEVICE — PACK HIP FX LAKES

## (undated) DEVICE — DRSG GAUZE 4X4" TRAY

## (undated) DEVICE — DRSG ABDOMINAL 07 1/2X8" 7197D

## (undated) DEVICE — SOL NACL 0.9% IRRIG 1000ML BOTTLE 07138-09

## (undated) DEVICE — BLANKET BAIR HUGGER UPPER BODY 42268

## (undated) DEVICE — ESU PENCIL W/COATED BLADE E2450H

## (undated) DEVICE — SU MONOCRYL 3-0 PS-2 18" UND Y497G

## (undated) DEVICE — SOL WATER IRRIG 1000ML BOTTLE 07139-09

## (undated) DEVICE — TUBING SUCTION MEDI-VAC 1/4"X20' N620A

## (undated) DEVICE — GLOVE PROTEXIS W/NEU-THERA 8.5  2D73TE85

## (undated) DEVICE — DRSG STERI STRIP 1/2X4" R1547

## (undated) DEVICE — DRAPE BACK TABLE  44X90" 8377

## (undated) DEVICE — SUCTION TIP POOLE K770

## (undated) DEVICE — SU VICRYL 1 CT-1 36" UND J947H

## (undated) DEVICE — BASIN SET MINOR DISP

## (undated) RX ORDER — HYDROMORPHONE HYDROCHLORIDE 1 MG/ML
INJECTION, SOLUTION INTRAMUSCULAR; INTRAVENOUS; SUBCUTANEOUS
Status: DISPENSED
Start: 2019-01-11

## (undated) RX ORDER — CEFAZOLIN SODIUM 2 G/100ML
INJECTION, SOLUTION INTRAVENOUS
Status: DISPENSED
Start: 2019-01-11

## (undated) RX ORDER — FENTANYL CITRATE 50 UG/ML
INJECTION, SOLUTION INTRAMUSCULAR; INTRAVENOUS
Status: DISPENSED
Start: 2018-06-27

## (undated) RX ORDER — DEXAMETHASONE SODIUM PHOSPHATE 4 MG/ML
INJECTION, SOLUTION INTRA-ARTICULAR; INTRALESIONAL; INTRAMUSCULAR; INTRAVENOUS; SOFT TISSUE
Status: DISPENSED
Start: 2018-06-27

## (undated) RX ORDER — KETOROLAC TROMETHAMINE 30 MG/ML
INJECTION, SOLUTION INTRAMUSCULAR; INTRAVENOUS
Status: DISPENSED
Start: 2019-01-11

## (undated) RX ORDER — FENTANYL CITRATE 50 UG/ML
INJECTION, SOLUTION INTRAMUSCULAR; INTRAVENOUS
Status: DISPENSED
Start: 2019-01-11

## (undated) RX ORDER — LIDOCAINE HYDROCHLORIDE 10 MG/ML
INJECTION, SOLUTION EPIDURAL; INFILTRATION; INTRACAUDAL; PERINEURAL
Status: DISPENSED
Start: 2019-01-11

## (undated) RX ORDER — KETOROLAC TROMETHAMINE 30 MG/ML
INJECTION, SOLUTION INTRAMUSCULAR; INTRAVENOUS
Status: DISPENSED
Start: 2018-06-27

## (undated) RX ORDER — PROPOFOL 10 MG/ML
INJECTION, EMULSION INTRAVENOUS
Status: DISPENSED
Start: 2019-01-11

## (undated) RX ORDER — ONDANSETRON 2 MG/ML
INJECTION INTRAMUSCULAR; INTRAVENOUS
Status: DISPENSED
Start: 2018-06-27

## (undated) RX ORDER — LIDOCAINE HYDROCHLORIDE 10 MG/ML
INJECTION, SOLUTION EPIDURAL; INFILTRATION; INTRACAUDAL; PERINEURAL
Status: DISPENSED
Start: 2018-06-27

## (undated) RX ORDER — GLYCOPYRROLATE 0.2 MG/ML
INJECTION, SOLUTION INTRAMUSCULAR; INTRAVENOUS
Status: DISPENSED
Start: 2018-06-27

## (undated) RX ORDER — DEXAMETHASONE SODIUM PHOSPHATE 4 MG/ML
INJECTION, SOLUTION INTRA-ARTICULAR; INTRALESIONAL; INTRAMUSCULAR; INTRAVENOUS; SOFT TISSUE
Status: DISPENSED
Start: 2019-01-11

## (undated) RX ORDER — ONDANSETRON 2 MG/ML
INJECTION INTRAMUSCULAR; INTRAVENOUS
Status: DISPENSED
Start: 2019-01-11

## (undated) RX ORDER — PROPOFOL 10 MG/ML
INJECTION, EMULSION INTRAVENOUS
Status: DISPENSED
Start: 2018-06-27